# Patient Record
Sex: FEMALE | Race: BLACK OR AFRICAN AMERICAN | NOT HISPANIC OR LATINO
[De-identification: names, ages, dates, MRNs, and addresses within clinical notes are randomized per-mention and may not be internally consistent; named-entity substitution may affect disease eponyms.]

---

## 2019-03-18 ENCOUNTER — APPOINTMENT (OUTPATIENT)
Dept: PHARMACY | Facility: CLINIC | Age: 79
End: 2019-03-18

## 2021-08-12 ENCOUNTER — RESULT REVIEW (OUTPATIENT)
Age: 81
End: 2021-08-12

## 2021-08-13 ENCOUNTER — RESULT REVIEW (OUTPATIENT)
Age: 81
End: 2021-08-13

## 2021-08-20 ENCOUNTER — NON-APPOINTMENT (OUTPATIENT)
Age: 81
End: 2021-08-20

## 2021-08-30 ENCOUNTER — APPOINTMENT (OUTPATIENT)
Dept: GYNECOLOGIC ONCOLOGY | Facility: CLINIC | Age: 81
End: 2021-08-30
Payer: MEDICARE

## 2021-08-30 VITALS
HEIGHT: 68 IN | BODY MASS INDEX: 40.01 KG/M2 | DIASTOLIC BLOOD PRESSURE: 83 MMHG | SYSTOLIC BLOOD PRESSURE: 135 MMHG | WEIGHT: 264 LBS | HEART RATE: 84 BPM

## 2021-08-30 VITALS — HEIGHT: 68 IN | BODY MASS INDEX: 40.14 KG/M2

## 2021-08-30 DIAGNOSIS — Z80.0 FAMILY HISTORY OF MALIGNANT NEOPLASM OF DIGESTIVE ORGANS: ICD-10-CM

## 2021-08-30 PROCEDURE — 99205 OFFICE O/P NEW HI 60 MIN: CPT

## 2021-08-30 RX ORDER — MULTIVITAMIN
TABLET ORAL
Refills: 0 | Status: ACTIVE | COMMUNITY

## 2021-08-30 RX ORDER — TORSEMIDE 20 MG/1
20 TABLET ORAL
Refills: 0 | Status: ACTIVE | COMMUNITY

## 2021-09-07 ENCOUNTER — APPOINTMENT (OUTPATIENT)
Dept: CT IMAGING | Facility: CLINIC | Age: 81
End: 2021-09-07
Payer: MEDICARE

## 2021-09-07 ENCOUNTER — OUTPATIENT (OUTPATIENT)
Dept: OUTPATIENT SERVICES | Facility: HOSPITAL | Age: 81
LOS: 1 days | End: 2021-09-07
Payer: MEDICARE

## 2021-09-07 DIAGNOSIS — Z00.8 ENCOUNTER FOR OTHER GENERAL EXAMINATION: ICD-10-CM

## 2021-09-07 PROCEDURE — 74177 CT ABD & PELVIS W/CONTRAST: CPT | Mod: 26,MH

## 2021-09-07 PROCEDURE — 82565 ASSAY OF CREATININE: CPT

## 2021-09-07 PROCEDURE — 74177 CT ABD & PELVIS W/CONTRAST: CPT

## 2021-09-13 ENCOUNTER — NON-APPOINTMENT (OUTPATIENT)
Age: 81
End: 2021-09-13

## 2021-09-14 ENCOUNTER — NON-APPOINTMENT (OUTPATIENT)
Age: 81
End: 2021-09-14

## 2021-09-16 PROBLEM — E11.9 DIABETES: Status: ACTIVE | Noted: 2021-08-30

## 2021-09-16 PROBLEM — K86.9 PANCREATIC LESION: Status: ACTIVE | Noted: 2021-09-16

## 2021-09-16 PROBLEM — E78.5 HYPERLIPIDEMIA: Status: ACTIVE | Noted: 2021-08-30

## 2021-09-16 PROBLEM — G47.33 OSA ON CPAP: Status: ACTIVE | Noted: 2021-08-30

## 2021-09-16 PROBLEM — I10 HYPERTENSION: Status: ACTIVE | Noted: 2021-08-30

## 2021-09-17 ENCOUNTER — APPOINTMENT (OUTPATIENT)
Dept: GYNECOLOGIC ONCOLOGY | Facility: CLINIC | Age: 81
End: 2021-09-17
Payer: MEDICARE

## 2021-09-17 DIAGNOSIS — K86.9 DISEASE OF PANCREAS, UNSPECIFIED: ICD-10-CM

## 2021-09-17 DIAGNOSIS — I10 ESSENTIAL (PRIMARY) HYPERTENSION: ICD-10-CM

## 2021-09-17 DIAGNOSIS — E11.9 TYPE 2 DIABETES MELLITUS W/OUT COMPLICATIONS: ICD-10-CM

## 2021-09-17 DIAGNOSIS — E78.5 HYPERLIPIDEMIA, UNSPECIFIED: ICD-10-CM

## 2021-09-17 DIAGNOSIS — G47.33 OBSTRUCTIVE SLEEP APNEA (ADULT) (PEDIATRIC): ICD-10-CM

## 2021-09-17 DIAGNOSIS — Z99.89 OBSTRUCTIVE SLEEP APNEA (ADULT) (PEDIATRIC): ICD-10-CM

## 2021-09-17 PROCEDURE — 99215 OFFICE O/P EST HI 40 MIN: CPT

## 2021-10-24 ENCOUNTER — NON-APPOINTMENT (OUTPATIENT)
Age: 81
End: 2021-10-24

## 2021-10-28 ENCOUNTER — NON-APPOINTMENT (OUTPATIENT)
Age: 81
End: 2021-10-28

## 2021-10-29 ENCOUNTER — APPOINTMENT (OUTPATIENT)
Dept: GYNECOLOGIC ONCOLOGY | Facility: CLINIC | Age: 81
End: 2021-10-29
Payer: MEDICARE

## 2021-10-29 DIAGNOSIS — R10.9 UNSPECIFIED ABDOMINAL PAIN: ICD-10-CM

## 2021-10-29 PROCEDURE — 99213 OFFICE O/P EST LOW 20 MIN: CPT | Mod: 95

## 2021-10-31 PROBLEM — R10.9 ABDOMINAL PAIN IN FEMALE: Status: ACTIVE | Noted: 2021-10-29

## 2021-11-01 ENCOUNTER — RESULT REVIEW (OUTPATIENT)
Age: 81
End: 2021-11-01

## 2021-11-05 ENCOUNTER — OUTPATIENT (OUTPATIENT)
Dept: OUTPATIENT SERVICES | Facility: HOSPITAL | Age: 81
LOS: 1 days | End: 2021-11-05
Payer: MEDICARE

## 2021-11-05 ENCOUNTER — APPOINTMENT (OUTPATIENT)
Dept: CT IMAGING | Facility: CLINIC | Age: 81
End: 2021-11-05
Payer: MEDICARE

## 2021-11-05 DIAGNOSIS — R10.9 UNSPECIFIED ABDOMINAL PAIN: ICD-10-CM

## 2021-11-05 PROCEDURE — G1004: CPT

## 2021-11-05 PROCEDURE — 74176 CT ABD & PELVIS W/O CONTRAST: CPT | Mod: MG

## 2021-11-05 PROCEDURE — 74176 CT ABD & PELVIS W/O CONTRAST: CPT | Mod: 26,MG

## 2021-11-09 ENCOUNTER — OUTPATIENT (OUTPATIENT)
Dept: OUTPATIENT SERVICES | Facility: HOSPITAL | Age: 81
LOS: 1 days | End: 2021-11-09
Payer: MEDICARE

## 2021-11-09 VITALS
DIASTOLIC BLOOD PRESSURE: 80 MMHG | OXYGEN SATURATION: 97 % | HEIGHT: 65 IN | RESPIRATION RATE: 20 BRPM | WEIGHT: 266.1 LBS | SYSTOLIC BLOOD PRESSURE: 110 MMHG | HEART RATE: 78 BPM | TEMPERATURE: 97 F

## 2021-11-09 DIAGNOSIS — C54.1 MALIGNANT NEOPLASM OF ENDOMETRIUM: ICD-10-CM

## 2021-11-09 DIAGNOSIS — E11.9 TYPE 2 DIABETES MELLITUS WITHOUT COMPLICATIONS: ICD-10-CM

## 2021-11-09 DIAGNOSIS — E66.01 MORBID (SEVERE) OBESITY DUE TO EXCESS CALORIES: ICD-10-CM

## 2021-11-09 DIAGNOSIS — I25.10 ATHEROSCLEROTIC HEART DISEASE OF NATIVE CORONARY ARTERY WITHOUT ANGINA PECTORIS: ICD-10-CM

## 2021-11-09 DIAGNOSIS — Z96.659 PRESENCE OF UNSPECIFIED ARTIFICIAL KNEE JOINT: Chronic | ICD-10-CM

## 2021-11-09 LAB
A1C WITH ESTIMATED AVERAGE GLUCOSE RESULT: 6.9 % — HIGH (ref 4–5.6)
ALBUMIN SERPL ELPH-MCNC: 4.5 G/DL — SIGNIFICANT CHANGE UP (ref 3.3–5)
ALP SERPL-CCNC: 90 U/L — SIGNIFICANT CHANGE UP (ref 40–120)
ALT FLD-CCNC: 12 U/L — SIGNIFICANT CHANGE UP (ref 4–33)
ANION GAP SERPL CALC-SCNC: 15 MMOL/L — HIGH (ref 7–14)
AST SERPL-CCNC: 13 U/L — SIGNIFICANT CHANGE UP (ref 4–32)
BILIRUB SERPL-MCNC: 0.5 MG/DL — SIGNIFICANT CHANGE UP (ref 0.2–1.2)
BLD GP AB SCN SERPL QL: NEGATIVE — SIGNIFICANT CHANGE UP
BUN SERPL-MCNC: 73 MG/DL — HIGH (ref 7–23)
CALCIUM SERPL-MCNC: 10.3 MG/DL — SIGNIFICANT CHANGE UP (ref 8.4–10.5)
CHLORIDE SERPL-SCNC: 93 MMOL/L — LOW (ref 98–107)
CO2 SERPL-SCNC: 30 MMOL/L — SIGNIFICANT CHANGE UP (ref 22–31)
CREAT SERPL-MCNC: 2.32 MG/DL — HIGH (ref 0.5–1.3)
ESTIMATED AVERAGE GLUCOSE: 151 — SIGNIFICANT CHANGE UP
GLUCOSE SERPL-MCNC: 143 MG/DL — HIGH (ref 70–99)
HCT VFR BLD CALC: 38 % — SIGNIFICANT CHANGE UP (ref 34.5–45)
HGB BLD-MCNC: 11.9 G/DL — SIGNIFICANT CHANGE UP (ref 11.5–15.5)
MCHC RBC-ENTMCNC: 27.4 PG — SIGNIFICANT CHANGE UP (ref 27–34)
MCHC RBC-ENTMCNC: 31.3 GM/DL — LOW (ref 32–36)
MCV RBC AUTO: 87.6 FL — SIGNIFICANT CHANGE UP (ref 80–100)
NRBC # BLD: 0 /100 WBCS — SIGNIFICANT CHANGE UP
NRBC # FLD: 0 K/UL — SIGNIFICANT CHANGE UP
PLATELET # BLD AUTO: 191 K/UL — SIGNIFICANT CHANGE UP (ref 150–400)
POTASSIUM SERPL-MCNC: 4.1 MMOL/L — SIGNIFICANT CHANGE UP (ref 3.5–5.3)
POTASSIUM SERPL-SCNC: 4.1 MMOL/L — SIGNIFICANT CHANGE UP (ref 3.5–5.3)
PROT SERPL-MCNC: 8.2 G/DL — SIGNIFICANT CHANGE UP (ref 6–8.3)
RBC # BLD: 4.34 M/UL — SIGNIFICANT CHANGE UP (ref 3.8–5.2)
RBC # FLD: 13.6 % — SIGNIFICANT CHANGE UP (ref 10.3–14.5)
RH IG SCN BLD-IMP: POSITIVE — SIGNIFICANT CHANGE UP
SODIUM SERPL-SCNC: 138 MMOL/L — SIGNIFICANT CHANGE UP (ref 135–145)
WBC # BLD: 6.3 K/UL — SIGNIFICANT CHANGE UP (ref 3.8–10.5)
WBC # FLD AUTO: 6.3 K/UL — SIGNIFICANT CHANGE UP (ref 3.8–10.5)

## 2021-11-09 PROCEDURE — 93010 ELECTROCARDIOGRAM REPORT: CPT

## 2021-11-09 RX ORDER — SODIUM CHLORIDE 9 MG/ML
1000 INJECTION, SOLUTION INTRAVENOUS
Refills: 0 | Status: DISCONTINUED | OUTPATIENT
Start: 2021-11-23 | End: 2021-11-24

## 2021-11-09 NOTE — H&P PST ADULT - ATTENDING COMMENTS
Seen in ASU. She reports no changes. Planned procedure disc, incl poss conversion to an open procedure (vert inc). Consent form reviewed. All Q/A. Case d/w Anesth team and Circ OR RN.

## 2021-11-09 NOTE — H&P PST ADULT - CARDIOVASCULAR COMMENTS
Hx CAD stent x1 2013 and recent hospitalization at [facility] - " water on heart" - pt unable to give details

## 2021-11-09 NOTE — H&P PST ADULT - MUSCULOSKELETAL COMMENTS
Pt c/o of lower back and neck pain with some ROM - uses walker for ambulation - hx b/l knee replacement and left knee tendon injury - pt c/o of pain left knee Pt c/o of lower back and neck pain - restricted ROM  - uses walker for ambulation - hx b/l knee replacement and left knee tendon injury - pt c/o of pain left knee

## 2021-11-09 NOTE — H&P PST ADULT - BACK COMMENTS
Pt using walker for ambulation - cannot stand straight  and c/o of pain lower back , lower abdomen and left knee

## 2021-11-09 NOTE — H&P PST ADULT - PROBLEM SELECTOR PLAN 3
Pt to stay on ASA 81 mg po preop for stent protection   CC requested - pt to see Cardio Friday - hx CAD, CHF, and recent hospitalization   Pt to take Entresto, Furosemide, Amlodipine , Isosorbide, Metalazone am DOS

## 2021-11-09 NOTE — H&P PST ADULT - LAST CARDIAC ANGIOGRAM/IMAGING
2013 1 stent placed - 10/ 2021 " [facility]  - repair of stent and I had fluid around my heart" 2013 1 stent placed - 10/ 2021 " [facility]  - repair of stent and I had fwater around my heart"

## 2021-11-09 NOTE — H&P PST ADULT - HISTORY OF PRESENT ILLNESS
Pt admits to COVID   Pt has had Pfizer vaccine   Patient instructed to contact surgeon's office concerning COVID test prior to surgery  Pty is a 81 yr old female scheduled for Robotic Assisted Total Laparoscopic Hysterectomy B/L Salpingo oophorectomy Lymph Node Sampling and Umbilical Hernia Repair wtih Dr Yusuf and Dr Guillory tentatively 11/23/21 - pt is poor historian and [place] - states she had abnormal vaginal bleeding and lower abdominal pain 2-3 months ago - biopsy positive and pt referred for surgery - pt continues to c/o of pelvic pain - pt hx CAD with stent placed 2013, and recent hospitalization at [facility] for 3 days - pt reports "water on heart" but unable to give further details. Hx DM, HTN, HLD, Sleep apnea, obesity, OA, knee disorder   Pt admits to COVID   Pt has had Pfizer vaccine   Patient instructed to contact surgeon's office concerning COVID test prior to surgery

## 2021-11-09 NOTE — H&P PST ADULT - GASTROINTESTINAL COMMENTS
Large umbilical hernia noted - pt denies pain or GI symptoms Pt c/o of discomfort with palpation across lower abdomen - large umbilical hernia noted

## 2021-11-09 NOTE — H&P PST ADULT - PROBLEM SELECTOR PLAN 1
Pt scheduled for surgery Robotic Assisted Total Laparoscopic Hysterectomy B/L Salpingo oophorectomy Lymph Node Sampling and Umbilical Hernia Repair wtih Dr Yusuf and Dr Guillory tentatively 11/23/21 and preop instructions including instructions for taking Famotidine on the day of surgery, given verbally and with use of  written materials, and patient confirming understanding of such instructions using  teach back method.  Pt has received MC from PCP

## 2021-11-09 NOTE — H&P PST ADULT - NSICDXPASTMEDICALHX_GEN_ALL_CORE_FT
PAST MEDICAL HISTORY:  CAD (coronary artery disease)     DM (diabetes mellitus)     Endometrial ca     H/O neuropathy     HLD (hyperlipidemia)     HTN (hypertension)     Obesity     Sleep apnea     Umbilical hernia      PAST MEDICAL HISTORY:  CAD (coronary artery disease)     Disorder of knee left tendon injury    DM (diabetes mellitus)     Endometrial ca     H/O neuropathy     HLD (hyperlipidemia)     HTN (hypertension)     Obesity     Sleep apnea     Umbilical hernia

## 2021-11-10 ENCOUNTER — NON-APPOINTMENT (OUTPATIENT)
Age: 81
End: 2021-11-10

## 2021-11-19 DIAGNOSIS — Z01.818 ENCOUNTER FOR OTHER PREPROCEDURAL EXAMINATION: ICD-10-CM

## 2021-11-20 ENCOUNTER — APPOINTMENT (OUTPATIENT)
Dept: DISASTER EMERGENCY | Facility: CLINIC | Age: 81
End: 2021-11-20

## 2021-11-21 LAB — SARS-COV-2 N GENE NPH QL NAA+PROBE: NOT DETECTED

## 2021-11-22 ENCOUNTER — TRANSCRIPTION ENCOUNTER (OUTPATIENT)
Age: 81
End: 2021-11-22

## 2021-11-22 NOTE — ASU PATIENT PROFILE, ADULT - NS TRANSFER DISPOSITION PATIENT BELONGINGS
ASU locker #16, walker in back of ASU ASU locker #16, walker in back of ASU, suitcase under back desk in ASU

## 2021-11-22 NOTE — ASU PATIENT PROFILE, ADULT - PREOP PAIN SCORE
Espasmo muscular   LO QUE NECESITA SABER:   Un espasmo muscular es gypsy contracción convulsiva involuntaria de cualquier músculo o de un jerrod de músculos  Un calambre muscular es un espasmo muscular muy doloroso  Los calambres musculares generalmente ocurren después del ejercicio intenso o debra el [facility]  Estos también pueden ser provocados por ciertos medicamentos, la deshidratación, bajos niveles de calcio o magnesio u otras condiciones de Pamela Gong Rosas [facility]ITAL[facility]:   Medicamentos:  Usted podría  necesitar lo siguiente:  · AINEs (Analgésicos antiinflamatorios no esteroides)  pueden disminuir la inflamación y el dolor o la fiebre  Daquan medicamento esta disponible con o sin gypsy receta médica  Los AINEs pueden causar sangrado estomacal o problemas renales en ciertas personas  Si usted cassy un medicamento anticoagulante, siempre pregúntele a adkins médico si los TIANA son seguros para usted  Siempre vinita la etiqueta de daquan medicamento y [place] instrucciones  · [place] lory medicamentos baron se le haya indicado  Consulte con adkins médico si usted armen que adkins medicamento no le está ayudando o si presenta efectos secundarios  Infórmele si es alérgico a algún medicamento  Mantenga gypsy lista actualizada de los [employer], las vitaminas y los productos herbales que cassy  Incluya los siguientes datos de los medicamentos: cantidad, frecuencia y motivo de administración  Traiga con usted la lista o los envases de la píldoras a lory citas de seguimiento  Lleve la lista de los medicamentos con usted en neel de gypsy emergencia  Acuda a lory consultas de control con adkins médico según le indicaron  Usted puede necesitar otros exámenes o tratamientos  También es posible que lo refieran a un fisioterapeuta u otro especialista  Anote lory preguntas para que se acuerde de hacerlas debra lory visitas  Cuidados personales:   · El estiramiento  de adkins músculo ayuda a aliviar el calambre   También puede ser de Chris Ang mantener el músculo estirado hasta que el calambre desaparezca  · Aplique calor  para ayudar a disminuir el dolor y el espasmo muscular  Aplíquese calor en el área lesionada debra 20 a 30 minutos cada 2 horas debra la cantidad de [employer] indiquen  · Aplique hielo  para ayudar a disminuir la inflamación y el dolor  El hielo también puede contribuir a evitar el daño de los tejidos  Use un paquete de hielo o ponga hielo molido dentro de [employer]  Envuelva la compresa o bolsa con gypsy toalla y colóquela sobre adkins músculo por 15 a 20 minutos cada hora o baron se lo indicaron  · Consuma más líquidos  para ayudar a prevenir espasmos musculares causados por la deshidratación  Las bebidas atléticas pueden ayudar a reemplazar los electrolitos que pierde debra el ejercicio por la sudoración  Pregunte a adkins médico sobre la cantidad de líquido que necesita asif todos los días y cuáles le recomienda  · Consuma alimentos saludables , baron frutas, verduras, granos integrales, productos lácteos bajos en grasa y proteínas bajas en grasa (carne Annitta Francisco, legumbres y pescado)  Si está embarazada, pregúntele a adkins médico qué alimentos son ricos en magnesio y [place]  Estos pueden ayudar para [employer] calambres debra el [place]  · Dé masajes suaves sobre el músculo  para aliviar el calambre  · Respire profundo varias veces  hasta que el calambre se mejore  Acuéstese mientras respira profundo para que no sufra un mareo o desvanecimiento  Pregúntele a adkins Maxcine Megha vitaminas y minerales son adecuados para usted  · Usted presenta signos de deshidratación, baron dolor de [place] de color amarillo oscuro, ojos o boca secos o latidos cardíacos rápidos  · Usted tiene preguntas o inquietudes acerca de adkins condición o cuidado  Regrese a la mitra de emergencias si:   · El músculo con el calambre está caliente al tacto, inflamado o enrojecido       · Usted sufre con frecuencia y sin alivio de calambres musculares en varios músculos diferentes  · Usted presenta calambres musculares con entumecimiento, cosquilleo y sensación quemante en lory nusrat y pies  © 2017 [address] Information is for End User's use only and may not be sold, redistributed or otherwise used for commercial purposes  All illustrations and images included in CareNotes® are the copyrighted property of A D A M , Inc  or Don Sprague  Esta información es sólo para uso en educación  Feldman intención no es darle un consejo médico sobre enfermedades o tratamientos  Colsulte con feldman Marisela Antis farmacéutico antes de seguir cualquier régimen médico para saber si es seguro y efectivo para usted  Esguince de hombro   LO QUE NECESITA SABER:   Se produce un esguince de hombro cuando se estira o se desgarra un ligamento del hombro  Los ligamentos son los tejidos resistentes que [employer]  Los ligamentos permiten alzar, bajar y hacer girar el Magdy Pagan  INSTRUCCIONES SOBRE EL J[facility]OSPITALARIA:   Regrese a la mitra de emergencias si:   · A usted le hace falta el aire  · Siente que se le thad la garganta o tiene dificultad para tragar  · Siente dolor repentino y marry en el pecho del mismo lado de la lesión  · Feldman piel se siente fría o húmeda al tacto  Pregúntele a feldman Joie Show vitaminas y minerales son adecuados para usted  · La piel del hombro lesionado se pone de color cony o pálida  · [employer] dolor o se le hincha más el hombro, o comienza a tener dolor o hinchazón  · Siente más rigidez o comienza a sentir rigidez cuando mueve el hombro lesionado  · Usted tiene preguntas o inquietudes acerca de feldman condición o cuidado  Medicamentos:   · Un medicamento con receta para el dolor  podrían ser [place]  Pregunte cómo asif estos medicamentos de gypsy forma arriaga  · [employer] lory medicamentos baron se le haya indicado    Consulte con feldman médico si usted armen que feldman medicamento no le está ayudando o si presenta efectos secundarios  Infórmele si es alérgico a cualquier medicamento  Mantenga gypsy lista actualizada de los [employer], las vitaminas y los productos herbales que cassy  Incluya los siguientes datos de los medicamentos: cantidad, frecuencia y motivo de administración  Traiga con usted la lista o los envases de la píldoras a lory citas de seguimiento  Lleve la lista de los medicamentos con usted en neel de gypsy emergencia  Acuda a lory consultas de control con adkins médico según le indicaron  Anote lory preguntas para que se acuerde de hacerlas debra lory visitas  Cuidados personales:   · Descanse  adkins hombro para que pueda sanar  Evite  el hombro mientras que adkins lesión dianelys  Riverlea ayudará a disminuir el riesgo de que se linette incluso más daño en el hombro  · Aplique hielo  en el hombro de 15 a 20 minutos cada hora o baron se lo indiquen  Use un paquete de hielo o ponga hielo molido dentro de [employer]  Cúbrala con gypsy toalla  El hielo ayuda a evitar daño al tejido y a disminuir la inflamación y el dolor  · Compresión adkins hombro según indicaciones  La compresión (presión jona) marvel [de-identified] y contribuye a bajar la inflamación y limitar el movimiento para que adkins hombro pueda sanar  Si adkins esguince es leve, puede que le den un cabestrillo para apoyar el Yonis Chute  Puede que necesite un soporte acolchado o un yeso para mantener el hombro en adkins lugar si el esguince es más grave  Cómo usar un soporte, cabestrillo o férula:  Es posible que deba usar un soporte, cabestrillo o férula para limitar el movimiento y proteger el hombro lesionado  · Use adkins soporte, cabestrillo o férula según indicaciones  Es posible que necesite llevarlo todo el tiempo y solamente quitarlo para bañarse o para hacer lory ejercicios  Pregúntele a adkins médico cuánto tiempo debe usarlo  · Mantenga adkins piel limpia y [place]    El acolchado debajo de la axila ayudará a absorber la transpiración y evitar que le salgan llagas en la piel  · No encorve los hombros   [place] puede causar dolor  Mantenga los hombros relajados  · Coloque el cabestrillo encima del brazo y la mano, de modo que también le cubra los nudillos  De daquan modo el cabestrillo también le sostendrá la charles y la Mcgregor  Coloque la charles por encima de la altura del codo  Se le podría adormecer o empezar a dolerle la charles si el cabestrillo es [employer]  Ejercite el hombro:  Después de descansar el hombro debra 3 a 7 días, tendrá que hacer ejercicios ligeros para disminuir la rigidez del hombro  Consulte con feldman médico antes de retomar lory actividades acostumbradas o volver a practicar deportes  Evite otra lesión:  Puede lastimarse el hombro de nuevo si detiene el tratamiento demasiado pronto  Lo siguiente podría disminuir feldman riesgo de hacerse un esguince:  · No linette ejercicio cuando esté cansado o con dolor  Entre en calor y estírese antes de hacer ejercicio  · Use equipo de protección cuando practique deportes  · Use zapatos que le calcen yuan y corra sobre superficies yifan para no caerse  © 2017 [address] Information is for End User's use only and may not be sold, redistributed or otherwise used for commercial purposes  All illustrations and images included in CareNotes® are the copyrighted property of A D A M , Inc  or oDn Sprague  Esta información es sólo para uso en educación  Feldman intención no es darle un consejo médico sobre enfermedades o tratamientos  Colsulte con feldman Verlon Joesph farmacéutico antes de seguir cualquier régimen médico para saber si es seguro y efectivo para usted  Renovación de la receta de medicamentos   LO QUE NECESITA SABER:   Es probable que en el departamento de emergencias le hayan dado gypsy receta para asif medicación por algunos días  Es importante volverse a surtir de feldman medicamento antes de que se le termine   En los próximos días, usted necesita programar gypsy shannon de seguimiento con adkins médico para que le de gypsy receta completa para adkins medicamento  En el departamento de emergencias, usted no recibirá [place]  INSTRUCCIONES SOBRE [facility]OSPITAL[facility]IA:   Programe gypsy consulta de seguimiento con adkins médico:  Comuníquese con adkins médico antes  de que se le termine adkins medicamento por completo  Anote lory preguntas para que se acuerde de hacerlas debra lory visitas  Consejos para resurtido de medicamentos  Adkins medicamento tratará adkins trastorno médico si usted lo cassy con regularidad  Prevenga el olvido de dosis haciendo lo siguiente:  · Use gypsy gráfica para adkins medicamento  Incluya todos lory medicamentos actuales  Anote el nombre y la dosis de cada medicamento, el número del receta y el número de veces que puede surtirse  También anote las fechas de cuándo necesita volverlos a surtir  Pregunte en la farmacia o a adkins médico sobre otras formas de ayudarle a estar pendiente de lory medicamentos  · Surta lory medicamentos unos días antes de que se le terminen  [place] disminuirá cualquier problema que pueda prevenir que usted reciba lory medicamentos a [place]  Los problemas incluyen que la farmacia esté cerrada o que la farmacia tenga que comunicarse con adkins médico     · Si usted sabe que tiene que viajar, vuelva a surtir lory medicamentos antes de salir de viaje  Es probable que usted no pueda surtir lory medicamentos si no [place] adkins farmacia local  También es probable que necesite llamar a adkins compañía de seguro médico para que estén informados de lory viajes  © 2017 [address] Information is for End User's use only and may not be sold, redistributed or otherwise used for commercial purposes  All illustrations and images included in CareNotes® are the copyrighted property of A D A M , Inc  or Semi[employer]  Esta información es sólo para uso en educación  Adkins intención no es darle un consejo médico sobre enfermedades o tratamientos   Colsulte con adkins Loza Bedoya farmacéutico antes de seguir cualquier régimen médico para saber si es seguro y efectivo para usted  0

## 2021-11-22 NOTE — ASU PATIENT PROFILE, ADULT - NSICDXPASTMEDICALHX_GEN_ALL_CORE_FT
PAST MEDICAL HISTORY:  CAD (coronary artery disease)     Disorder of knee left tendon injury    DM (diabetes mellitus)     Endometrial ca     H/O neuropathy     HLD (hyperlipidemia)     HTN (hypertension)     Obesity     Sleep apnea     Umbilical hernia

## 2021-11-23 ENCOUNTER — INPATIENT (INPATIENT)
Facility: HOSPITAL | Age: 81
LOS: 2 days | Discharge: HOME | End: 2021-11-26
Attending: OBSTETRICS & GYNECOLOGY | Admitting: OBSTETRICS & GYNECOLOGY
Payer: MEDICARE

## 2021-11-23 ENCOUNTER — APPOINTMENT (OUTPATIENT)
Dept: GYNECOLOGIC ONCOLOGY | Facility: HOSPITAL | Age: 81
End: 2021-11-23

## 2021-11-23 ENCOUNTER — RESULT REVIEW (OUTPATIENT)
Age: 81
End: 2021-11-23

## 2021-11-23 VITALS
SYSTOLIC BLOOD PRESSURE: 127 MMHG | TEMPERATURE: 99 F | DIASTOLIC BLOOD PRESSURE: 66 MMHG | HEART RATE: 82 BPM | OXYGEN SATURATION: 96 % | HEIGHT: 65 IN | RESPIRATION RATE: 18 BRPM | WEIGHT: 266.1 LBS

## 2021-11-23 DIAGNOSIS — E66.01 MORBID (SEVERE) OBESITY DUE TO EXCESS CALORIES: ICD-10-CM

## 2021-11-23 DIAGNOSIS — C54.1 MALIGNANT NEOPLASM OF ENDOMETRIUM: ICD-10-CM

## 2021-11-23 DIAGNOSIS — Z96.659 PRESENCE OF UNSPECIFIED ARTIFICIAL KNEE JOINT: Chronic | ICD-10-CM

## 2021-11-23 LAB
ANION GAP SERPL CALC-SCNC: 17 MMOL/L — HIGH (ref 7–14)
BASOPHILS # BLD AUTO: 0.03 K/UL — SIGNIFICANT CHANGE UP (ref 0–0.2)
BASOPHILS NFR BLD AUTO: 0.3 % — SIGNIFICANT CHANGE UP (ref 0–2)
BLOOD GAS ARTERIAL - LYTES,HGB,ICA,LACT RESULT: SIGNIFICANT CHANGE UP
BUN SERPL-MCNC: 69 MG/DL — HIGH (ref 7–23)
CALCIUM SERPL-MCNC: 9.9 MG/DL — SIGNIFICANT CHANGE UP (ref 8.4–10.5)
CHLORIDE SERPL-SCNC: 98 MMOL/L — SIGNIFICANT CHANGE UP (ref 98–107)
CO2 SERPL-SCNC: 25 MMOL/L — SIGNIFICANT CHANGE UP (ref 22–31)
CREAT SERPL-MCNC: 1.92 MG/DL — HIGH (ref 0.5–1.3)
EOSINOPHIL # BLD AUTO: 0.01 K/UL — SIGNIFICANT CHANGE UP (ref 0–0.5)
EOSINOPHIL NFR BLD AUTO: 0.1 % — SIGNIFICANT CHANGE UP (ref 0–6)
GAS PNL BLDA: SIGNIFICANT CHANGE UP
GLUCOSE BLDC GLUCOMTR-MCNC: 153 MG/DL — HIGH (ref 70–99)
GLUCOSE BLDC GLUCOMTR-MCNC: 214 MG/DL — HIGH (ref 70–99)
GLUCOSE BLDC GLUCOMTR-MCNC: 223 MG/DL — HIGH (ref 70–99)
GLUCOSE SERPL-MCNC: 233 MG/DL — HIGH (ref 70–99)
HCT VFR BLD CALC: 35.9 % — SIGNIFICANT CHANGE UP (ref 34.5–45)
HGB BLD-MCNC: 11.8 G/DL — SIGNIFICANT CHANGE UP (ref 11.5–15.5)
IANC: 8.18 K/UL — SIGNIFICANT CHANGE UP (ref 1.5–8.5)
IMM GRANULOCYTES NFR BLD AUTO: 0.4 % — SIGNIFICANT CHANGE UP (ref 0–1.5)
LYMPHOCYTES # BLD AUTO: 0.97 K/UL — LOW (ref 1–3.3)
LYMPHOCYTES # BLD AUTO: 10.3 % — LOW (ref 13–44)
MAGNESIUM SERPL-MCNC: 2.3 MG/DL — SIGNIFICANT CHANGE UP (ref 1.6–2.6)
MCHC RBC-ENTMCNC: 28.3 PG — SIGNIFICANT CHANGE UP (ref 27–34)
MCHC RBC-ENTMCNC: 32.9 GM/DL — SIGNIFICANT CHANGE UP (ref 32–36)
MCV RBC AUTO: 86.1 FL — SIGNIFICANT CHANGE UP (ref 80–100)
MONOCYTES # BLD AUTO: 0.15 K/UL — SIGNIFICANT CHANGE UP (ref 0–0.9)
MONOCYTES NFR BLD AUTO: 1.6 % — LOW (ref 2–14)
NEUTROPHILS # BLD AUTO: 8.18 K/UL — HIGH (ref 1.8–7.4)
NEUTROPHILS NFR BLD AUTO: 87.3 % — HIGH (ref 43–77)
NRBC # BLD: 0 /100 WBCS — SIGNIFICANT CHANGE UP
NRBC # FLD: 0 K/UL — SIGNIFICANT CHANGE UP
PHOSPHATE SERPL-MCNC: 3 MG/DL — SIGNIFICANT CHANGE UP (ref 2.5–4.5)
PLATELET # BLD AUTO: 202 K/UL — SIGNIFICANT CHANGE UP (ref 150–400)
POTASSIUM SERPL-MCNC: 3.1 MMOL/L — LOW (ref 3.5–5.3)
POTASSIUM SERPL-SCNC: 3.1 MMOL/L — LOW (ref 3.5–5.3)
RBC # BLD: 4.17 M/UL — SIGNIFICANT CHANGE UP (ref 3.8–5.2)
RBC # FLD: 13.7 % — SIGNIFICANT CHANGE UP (ref 10.3–14.5)
SODIUM SERPL-SCNC: 140 MMOL/L — SIGNIFICANT CHANGE UP (ref 135–145)
WBC # BLD: 9.38 K/UL — SIGNIFICANT CHANGE UP (ref 3.8–10.5)
WBC # FLD AUTO: 9.38 K/UL — SIGNIFICANT CHANGE UP (ref 3.8–10.5)

## 2021-11-23 PROCEDURE — 88305 TISSUE EXAM BY PATHOLOGIST: CPT | Mod: 26

## 2021-11-23 PROCEDURE — 88341 IMHCHEM/IMCYTCHM EA ADD ANTB: CPT | Mod: 26

## 2021-11-23 PROCEDURE — 88342 IMHCHEM/IMCYTCHM 1ST ANTB: CPT | Mod: 26

## 2021-11-23 PROCEDURE — 88112 CYTOPATH CELL ENHANCE TECH: CPT | Mod: 26

## 2021-11-23 PROCEDURE — 88302 TISSUE EXAM BY PATHOLOGIST: CPT | Mod: 26

## 2021-11-23 PROCEDURE — 88307 TISSUE EXAM BY PATHOLOGIST: CPT | Mod: 26

## 2021-11-23 RX ORDER — AMLODIPINE BESYLATE 2.5 MG/1
1 TABLET ORAL
Qty: 0 | Refills: 0 | DISCHARGE

## 2021-11-23 RX ORDER — ISOSORBIDE DINITRATE 5 MG/1
1 TABLET ORAL
Qty: 0 | Refills: 0 | DISCHARGE

## 2021-11-23 RX ORDER — ONDANSETRON 8 MG/1
4 TABLET, FILM COATED ORAL ONCE
Refills: 0 | Status: DISCONTINUED | OUTPATIENT
Start: 2021-11-23 | End: 2021-11-23

## 2021-11-23 RX ORDER — HYDROMORPHONE HYDROCHLORIDE 2 MG/ML
1 INJECTION INTRAMUSCULAR; INTRAVENOUS; SUBCUTANEOUS
Refills: 0 | Status: DISCONTINUED | OUTPATIENT
Start: 2021-11-23 | End: 2021-11-23

## 2021-11-23 RX ORDER — HYDROMORPHONE HYDROCHLORIDE 2 MG/ML
0.5 INJECTION INTRAMUSCULAR; INTRAVENOUS; SUBCUTANEOUS
Refills: 0 | Status: DISCONTINUED | OUTPATIENT
Start: 2021-11-23 | End: 2021-11-23

## 2021-11-23 RX ORDER — HEPARIN SODIUM 5000 [USP'U]/ML
5000 INJECTION INTRAVENOUS; SUBCUTANEOUS EVERY 8 HOURS
Refills: 0 | Status: DISCONTINUED | OUTPATIENT
Start: 2021-11-23 | End: 2021-11-26

## 2021-11-23 RX ORDER — OXYCODONE HYDROCHLORIDE 5 MG/1
5 TABLET ORAL EVERY 4 HOURS
Refills: 0 | Status: DISCONTINUED | OUTPATIENT
Start: 2021-11-23 | End: 2021-11-26

## 2021-11-23 RX ORDER — GLIMEPIRIDE 1 MG
1 TABLET ORAL
Qty: 0 | Refills: 0 | DISCHARGE

## 2021-11-23 RX ORDER — INSULIN LISPRO 100/ML
VIAL (ML) SUBCUTANEOUS
Refills: 0 | Status: DISCONTINUED | OUTPATIENT
Start: 2021-11-23 | End: 2021-11-26

## 2021-11-23 RX ORDER — SACUBITRIL AND VALSARTAN 24; 26 MG/1; MG/1
1 TABLET, FILM COATED ORAL
Qty: 0 | Refills: 0 | DISCHARGE

## 2021-11-23 RX ORDER — ASPIRIN/CALCIUM CARB/MAGNESIUM 324 MG
1 TABLET ORAL
Qty: 0 | Refills: 0 | DISCHARGE

## 2021-11-23 RX ORDER — ATORVASTATIN CALCIUM 80 MG/1
1 TABLET, FILM COATED ORAL
Qty: 0 | Refills: 0 | DISCHARGE

## 2021-11-23 RX ORDER — FUROSEMIDE 40 MG
1 TABLET ORAL
Qty: 0 | Refills: 0 | DISCHARGE

## 2021-11-23 RX ORDER — CHLORHEXIDINE GLUCONATE 213 G/1000ML
1 SOLUTION TOPICAL DAILY
Refills: 0 | Status: DISCONTINUED | OUTPATIENT
Start: 2021-11-23 | End: 2021-11-24

## 2021-11-23 RX ORDER — GLUCAGON INJECTION, SOLUTION 0.5 MG/.1ML
1 INJECTION, SOLUTION SUBCUTANEOUS ONCE
Refills: 0 | Status: DISCONTINUED | OUTPATIENT
Start: 2021-11-23 | End: 2021-11-26

## 2021-11-23 RX ORDER — METOPROLOL TARTRATE 50 MG
5 TABLET ORAL EVERY 6 HOURS
Refills: 0 | Status: DISCONTINUED | OUTPATIENT
Start: 2021-11-23 | End: 2021-11-24

## 2021-11-23 RX ORDER — ACETAMINOPHEN 500 MG
1000 TABLET ORAL ONCE
Refills: 0 | Status: COMPLETED | OUTPATIENT
Start: 2021-11-23 | End: 2021-11-23

## 2021-11-23 RX ORDER — SODIUM CHLORIDE 9 MG/ML
1000 INJECTION, SOLUTION INTRAVENOUS
Refills: 0 | Status: DISCONTINUED | OUTPATIENT
Start: 2021-11-23 | End: 2021-11-24

## 2021-11-23 RX ORDER — FENTANYL CITRATE 50 UG/ML
50 INJECTION INTRAVENOUS
Refills: 0 | Status: DISCONTINUED | OUTPATIENT
Start: 2021-11-23 | End: 2021-11-23

## 2021-11-23 RX ORDER — POTASSIUM CHLORIDE 20 MEQ
40 PACKET (EA) ORAL ONCE
Refills: 0 | Status: COMPLETED | OUTPATIENT
Start: 2021-11-23 | End: 2021-11-23

## 2021-11-23 RX ORDER — GABAPENTIN 400 MG/1
1 CAPSULE ORAL
Qty: 0 | Refills: 0 | DISCHARGE

## 2021-11-23 RX ORDER — ACETAMINOPHEN 500 MG
1000 TABLET ORAL ONCE
Refills: 0 | Status: COMPLETED | OUTPATIENT
Start: 2021-11-23 | End: 2021-11-24

## 2021-11-23 RX ADMIN — Medication 400 MILLIGRAM(S): at 23:49

## 2021-11-23 RX ADMIN — OXYCODONE HYDROCHLORIDE 5 MILLIGRAM(S): 5 TABLET ORAL at 21:27

## 2021-11-23 RX ADMIN — SODIUM CHLORIDE 30 MILLILITER(S): 9 INJECTION, SOLUTION INTRAVENOUS at 11:29

## 2021-11-23 RX ADMIN — HYDROMORPHONE HYDROCHLORIDE 1 MILLIGRAM(S): 2 INJECTION INTRAMUSCULAR; INTRAVENOUS; SUBCUTANEOUS at 20:49

## 2021-11-23 RX ADMIN — FENTANYL CITRATE 50 MICROGRAM(S): 50 INJECTION INTRAVENOUS at 22:00

## 2021-11-23 RX ADMIN — OXYCODONE HYDROCHLORIDE 5 MILLIGRAM(S): 5 TABLET ORAL at 21:57

## 2021-11-23 RX ADMIN — FENTANYL CITRATE 50 MICROGRAM(S): 50 INJECTION INTRAVENOUS at 21:30

## 2021-11-23 RX ADMIN — HEPARIN SODIUM 5000 UNIT(S): 5000 INJECTION INTRAVENOUS; SUBCUTANEOUS at 22:53

## 2021-11-23 RX ADMIN — HYDROMORPHONE HYDROCHLORIDE 1 MILLIGRAM(S): 2 INJECTION INTRAMUSCULAR; INTRAVENOUS; SUBCUTANEOUS at 20:29

## 2021-11-23 RX ADMIN — SODIUM CHLORIDE 125 MILLILITER(S): 9 INJECTION, SOLUTION INTRAVENOUS at 23:49

## 2021-11-23 RX ADMIN — CHLORHEXIDINE GLUCONATE 1 APPLICATION(S): 213 SOLUTION TOPICAL at 11:30

## 2021-11-23 RX ADMIN — Medication 40 MILLIEQUIVALENT(S): at 22:17

## 2021-11-23 RX ADMIN — Medication 2: at 21:17

## 2021-11-23 NOTE — BRIEF OPERATIVE NOTE - COMMENTS
Nir Hinson. I served as Dr Guillory's 1st assistant for the hernia repair as there was no qualified resident for that portion of the procedure.

## 2021-11-23 NOTE — BRIEF OPERATIVE NOTE - OPERATION/FINDINGS
EUA, Dil of Cx, Inj of ICG, L/S RHONA, Abel TLH, BSO, Pelvic LN Biopsies  Nl LGT, Sounded to 8+ cm. Medium V-Care.  L/S: No ascites or studding. Omentum incarcerated in Periumb defect; released. Vis Liver, SB, appy, LB nl. Ut ~8-10 weeks; adnexae nl. No mapping, Due to limited exposure, limited sampling undertaken. Ureters visualized.   Urine clear. Topical hemostats-V, SSNK. CC.

## 2021-11-23 NOTE — BRIEF OPERATIVE NOTE - NSICDXBRIEFPOSTOP_GEN_ALL_CORE_FT
POST-OP DIAGNOSIS:  Endometrial cancer 23-Nov-2021 19:31:54  Franko Yusuf  Ventral hernia 23-Nov-2021 19:32:12  Franko Yusuf

## 2021-11-23 NOTE — PROGRESS NOTE ADULT - SUBJECTIVE AND OBJECTIVE BOX
PA POST-OP CHECK GYN ONCOLOGY NOTE    Patient seen and examined at bedside in PACU. Patient awake and resting comfortably. Reports pain is under control at this time. Otherwise doing well. Patient denies fever, chills, chest pain, SOB, nausea, vomiting, lightheadedness, and dizziness.      T(F): 97.7 (11-23-21 @ 19:55), Max: 98.6 (11-23-21 @ 10:17)  HR: 78 (11-23-21 @ 21:30) (67 - 89)  BP: 140/70 (11-23-21 @ 21:30) (113/50 - 140/70)  RR: 15 (11-23-21 @ 21:30) (12 - 20)  SpO2: 96% (11-23-21 @ 21:30) (96% - 98%)      I&O's Detail    23 Nov 2021 07:01  -  23 Nov 2021 22:00  --------------------------------------------------------  IN:    Lactated Ringers: 250 mL  Total IN: 250 mL    OUT:    Indwelling Catheter - Urethral (mL): 325 mL  Total OUT: 325 mL    Total NET: -75 mL      Physical Exam:  General: NAD, resting in bed comfortably  Chest: CTA b/l  Heart: s1s2    Abdomen: soft, appropriately tender to palpation  Incisional site: clean, dry, intact  Extremities: no swelling or calf tenderness bilaterally      LABS:                        11.8   9.38  )-----------( 202      ( 23 Nov 2021 20:31 )             35.9     11-23    140  |  98  |  69<H>  ----------------------------<  233<H>  3.1<L>   |  25  |  1.92<H>    Ca    9.9      23 Nov 2021 20:31  Phos  3.0     11-23  Mg     2.30     11-23      MEDICATIONS  (STANDING):  acetaminophen   IVPB .. 1000 milliGRAM(s) IV Intermittent once  acetaminophen   IVPB .. 1000 milliGRAM(s) IV Intermittent once  chlorhexidine 2% Cloths 1 Application(s) Topical daily  glucagon  Injectable 1 milliGRAM(s) IntraMuscular once  heparin   Injectable 5000 Unit(s) SubCutaneous every 8 hours  insulin lispro (ADMELOG) corrective regimen sliding scale   SubCutaneous three times a day before meals  lactated ringers. 1000 milliLiter(s) (30 mL/Hr) IV Continuous <Continuous>  lactated ringers. 1000 milliLiter(s) (125 mL/Hr) IV Continuous <Continuous>  potassium chloride    Tablet ER 40 milliEquivalent(s) Oral once    MEDICATIONS  (PRN):  fentaNYL    Injectable 50 MICROGram(s) IV Push every 10 minutes PRN Mild Pain (1 - 3)  HYDROmorphone  Injectable 0.5 milliGRAM(s) IV Push every 10 minutes PRN Moderate Pain (4 - 6)  HYDROmorphone  Injectable 1 milliGRAM(s) IV Push every 10 minutes PRN Severe Pain (7 - 10)  metoprolol tartrate Injectable 5 milliGRAM(s) IV Push every 6 hours PRN BP >160/110  ondansetron Injectable 4 milliGRAM(s) IV Push once PRN Nausea and/or Vomiting  oxyCODONE    IR 5 milliGRAM(s) Oral every 4 hours PRN Moderate Pain (4 - 6)      A/P: This is a 81y female, POD#0, s/p RA-TLH, BSO, LND, Umbilical hernia repair. Patient is stable and doing well post operatively.    Plan:  Pain control as needed. Avoid NSAIDs  Anti-emetic as needed  Continuous pulse oximetry (for hx of DON)  Encourage incentive spirometer use  Hinson  Monitor I/Os  IV Hydration with LR at 125mL/hr  Clear liquid diet as tolerated  Out of bed with assist as tolerated  ISS (for hx of DM). Monitor FS  Lopressor PRN (for hx of HTN)  AM Labs. Replete electrolytes as needed  DVT ppx: Heparin subQ  Dispo: Admit to floor and routine post-op care    Patient's case and post-op condition discussed with overnight Gyn/Onc resident team. Above plans as per our discussion.  Spectra #[id number]

## 2021-11-23 NOTE — ASU PREOP CHECKLIST - SITE MARKED BY SURGEON
How Severe Are Your Spot(S)?: mild What Type Of Note Output Would You Prefer (Optional)?: Standard Output What Is The Reason For Today's Visit?: Full Body Skin Examination What Is The Reason For Today's Visit? (Being Monitored For X): concerning skin lesions on an annual basis yes

## 2021-11-23 NOTE — BRIEF OPERATIVE NOTE - NSICDXBRIEFPREOP_GEN_ALL_CORE_FT
PRE-OP DIAGNOSIS:  Endometrial cancer 23-Nov-2021 19:31:24  Franko Yusuf  Ventral hernia 23-Nov-2021 19:31:38  Franko Yusuf

## 2021-11-23 NOTE — ASU PREOP CHECKLIST - COMMENTS
pt took famotidine, entresto, gabapentin, furosemide, metalozone and isosorbide at 0530 with sips water

## 2021-11-24 DIAGNOSIS — C54.1 MALIGNANT NEOPLASM OF ENDOMETRIUM: ICD-10-CM

## 2021-11-24 DIAGNOSIS — E78.5 HYPERLIPIDEMIA, UNSPECIFIED: ICD-10-CM

## 2021-11-24 DIAGNOSIS — I50.20 UNSPECIFIED SYSTOLIC (CONGESTIVE) HEART FAILURE: ICD-10-CM

## 2021-11-24 DIAGNOSIS — Z98.890 OTHER SPECIFIED POSTPROCEDURAL STATES: ICD-10-CM

## 2021-11-24 DIAGNOSIS — I25.10 ATHEROSCLEROTIC HEART DISEASE OF NATIVE CORONARY ARTERY WITHOUT ANGINA PECTORIS: ICD-10-CM

## 2021-11-24 DIAGNOSIS — E11.9 TYPE 2 DIABETES MELLITUS WITHOUT COMPLICATIONS: ICD-10-CM

## 2021-11-24 DIAGNOSIS — Z29.9 ENCOUNTER FOR PROPHYLACTIC MEASURES, UNSPECIFIED: ICD-10-CM

## 2021-11-24 DIAGNOSIS — N18.9 CHRONIC KIDNEY DISEASE, UNSPECIFIED: ICD-10-CM

## 2021-11-24 LAB
ANION GAP SERPL CALC-SCNC: 12 MMOL/L — SIGNIFICANT CHANGE UP (ref 7–14)
BASOPHILS # BLD AUTO: 0.03 K/UL — SIGNIFICANT CHANGE UP (ref 0–0.2)
BASOPHILS NFR BLD AUTO: 0.3 % — SIGNIFICANT CHANGE UP (ref 0–2)
BUN SERPL-MCNC: 56 MG/DL — HIGH (ref 7–23)
CALCIUM SERPL-MCNC: 9.7 MG/DL — SIGNIFICANT CHANGE UP (ref 8.4–10.5)
CHLORIDE SERPL-SCNC: 101 MMOL/L — SIGNIFICANT CHANGE UP (ref 98–107)
CO2 SERPL-SCNC: 28 MMOL/L — SIGNIFICANT CHANGE UP (ref 22–31)
COVID-19 NUCLEOCAPSID GAM AB INTERP: NEGATIVE — SIGNIFICANT CHANGE UP
COVID-19 NUCLEOCAPSID TOTAL GAM ANTIBODY RESULT: 0.08 INDEX — SIGNIFICANT CHANGE UP
COVID-19 SPIKE DOMAIN AB INTERP: POSITIVE
COVID-19 SPIKE DOMAIN ANTIBODY RESULT: 154 U/ML — HIGH
CREAT SERPL-MCNC: 1.68 MG/DL — HIGH (ref 0.5–1.3)
EOSINOPHIL # BLD AUTO: 0 K/UL — SIGNIFICANT CHANGE UP (ref 0–0.5)
EOSINOPHIL NFR BLD AUTO: 0 % — SIGNIFICANT CHANGE UP (ref 0–6)
GLUCOSE BLDC GLUCOMTR-MCNC: 140 MG/DL — HIGH (ref 70–99)
GLUCOSE BLDC GLUCOMTR-MCNC: 155 MG/DL — HIGH (ref 70–99)
GLUCOSE BLDC GLUCOMTR-MCNC: 155 MG/DL — HIGH (ref 70–99)
GLUCOSE BLDC GLUCOMTR-MCNC: 170 MG/DL — HIGH (ref 70–99)
GLUCOSE SERPL-MCNC: 183 MG/DL — HIGH (ref 70–99)
HCT VFR BLD CALC: 35.1 % — SIGNIFICANT CHANGE UP (ref 34.5–45)
HGB BLD-MCNC: 11.2 G/DL — LOW (ref 11.5–15.5)
IANC: 9.87 K/UL — HIGH (ref 1.5–8.5)
IMM GRANULOCYTES NFR BLD AUTO: 0.5 % — SIGNIFICANT CHANGE UP (ref 0–1.5)
LYMPHOCYTES # BLD AUTO: 1.01 K/UL — SIGNIFICANT CHANGE UP (ref 1–3.3)
LYMPHOCYTES # BLD AUTO: 8.5 % — LOW (ref 13–44)
MAGNESIUM SERPL-MCNC: 2.3 MG/DL — SIGNIFICANT CHANGE UP (ref 1.6–2.6)
MCHC RBC-ENTMCNC: 28.5 PG — SIGNIFICANT CHANGE UP (ref 27–34)
MCHC RBC-ENTMCNC: 31.9 GM/DL — LOW (ref 32–36)
MCV RBC AUTO: 89.3 FL — SIGNIFICANT CHANGE UP (ref 80–100)
MONOCYTES # BLD AUTO: 0.9 K/UL — SIGNIFICANT CHANGE UP (ref 0–0.9)
MONOCYTES NFR BLD AUTO: 7.6 % — SIGNIFICANT CHANGE UP (ref 2–14)
NEUTROPHILS # BLD AUTO: 9.87 K/UL — HIGH (ref 1.8–7.4)
NEUTROPHILS NFR BLD AUTO: 83.1 % — HIGH (ref 43–77)
NRBC # BLD: 0 /100 WBCS — SIGNIFICANT CHANGE UP
NRBC # FLD: 0 K/UL — SIGNIFICANT CHANGE UP
PHOSPHATE SERPL-MCNC: 2.5 MG/DL — SIGNIFICANT CHANGE UP (ref 2.5–4.5)
PLATELET # BLD AUTO: 214 K/UL — SIGNIFICANT CHANGE UP (ref 150–400)
POTASSIUM SERPL-MCNC: 3.7 MMOL/L — SIGNIFICANT CHANGE UP (ref 3.5–5.3)
POTASSIUM SERPL-SCNC: 3.7 MMOL/L — SIGNIFICANT CHANGE UP (ref 3.5–5.3)
RBC # BLD: 3.93 M/UL — SIGNIFICANT CHANGE UP (ref 3.8–5.2)
RBC # FLD: 13.8 % — SIGNIFICANT CHANGE UP (ref 10.3–14.5)
SARS-COV-2 IGG+IGM SERPL QL IA: 0.08 INDEX — SIGNIFICANT CHANGE UP
SARS-COV-2 IGG+IGM SERPL QL IA: 154 U/ML — HIGH
SARS-COV-2 IGG+IGM SERPL QL IA: NEGATIVE — SIGNIFICANT CHANGE UP
SARS-COV-2 IGG+IGM SERPL QL IA: POSITIVE
SODIUM SERPL-SCNC: 141 MMOL/L — SIGNIFICANT CHANGE UP (ref 135–145)
WBC # BLD: 11.87 K/UL — HIGH (ref 3.8–10.5)
WBC # FLD AUTO: 11.87 K/UL — HIGH (ref 3.8–10.5)

## 2021-11-24 PROCEDURE — 99223 1ST HOSP IP/OBS HIGH 75: CPT

## 2021-11-24 RX ORDER — SACUBITRIL AND VALSARTAN 24; 26 MG/1; MG/1
1 TABLET, FILM COATED ORAL
Refills: 0 | Status: DISCONTINUED | OUTPATIENT
Start: 2021-11-24 | End: 2021-11-26

## 2021-11-24 RX ORDER — SODIUM,POTASSIUM PHOSPHATES 278-250MG
1 POWDER IN PACKET (EA) ORAL ONCE
Refills: 0 | Status: COMPLETED | OUTPATIENT
Start: 2021-11-24 | End: 2021-11-24

## 2021-11-24 RX ORDER — POTASSIUM CHLORIDE 20 MEQ
20 PACKET (EA) ORAL
Refills: 0 | Status: COMPLETED | OUTPATIENT
Start: 2021-11-24 | End: 2021-11-24

## 2021-11-24 RX ORDER — ASPIRIN/CALCIUM CARB/MAGNESIUM 324 MG
81 TABLET ORAL DAILY
Refills: 0 | Status: DISCONTINUED | OUTPATIENT
Start: 2021-11-24 | End: 2021-11-26

## 2021-11-24 RX ORDER — ACETAMINOPHEN 500 MG
975 TABLET ORAL EVERY 6 HOURS
Refills: 0 | Status: DISCONTINUED | OUTPATIENT
Start: 2021-11-24 | End: 2021-11-26

## 2021-11-24 RX ORDER — SENNA PLUS 8.6 MG/1
1 TABLET ORAL ONCE
Refills: 0 | Status: COMPLETED | OUTPATIENT
Start: 2021-11-24 | End: 2021-11-24

## 2021-11-24 RX ORDER — CARVEDILOL PHOSPHATE 80 MG/1
12.5 CAPSULE, EXTENDED RELEASE ORAL EVERY 12 HOURS
Refills: 0 | Status: DISCONTINUED | OUTPATIENT
Start: 2021-11-24 | End: 2021-11-26

## 2021-11-24 RX ORDER — ACETAMINOPHEN 500 MG
975 TABLET ORAL EVERY 6 HOURS
Refills: 0 | Status: COMPLETED | OUTPATIENT
Start: 2021-11-24 | End: 2022-10-23

## 2021-11-24 RX ADMIN — Medication 1000 MILLIGRAM(S): at 06:50

## 2021-11-24 RX ADMIN — Medication 1: at 18:12

## 2021-11-24 RX ADMIN — Medication 975 MILLIGRAM(S): at 14:50

## 2021-11-24 RX ADMIN — HEPARIN SODIUM 5000 UNIT(S): 5000 INJECTION INTRAVENOUS; SUBCUTANEOUS at 06:17

## 2021-11-24 RX ADMIN — OXYCODONE HYDROCHLORIDE 5 MILLIGRAM(S): 5 TABLET ORAL at 10:15

## 2021-11-24 RX ADMIN — SENNA PLUS 1 TABLET(S): 8.6 TABLET ORAL at 18:53

## 2021-11-24 RX ADMIN — Medication 1: at 08:12

## 2021-11-24 RX ADMIN — OXYCODONE HYDROCHLORIDE 5 MILLIGRAM(S): 5 TABLET ORAL at 14:50

## 2021-11-24 RX ADMIN — Medication 1000 MILLIGRAM(S): at 00:25

## 2021-11-24 RX ADMIN — Medication 81 MILLIGRAM(S): at 18:11

## 2021-11-24 RX ADMIN — SACUBITRIL AND VALSARTAN 1 TABLET(S): 24; 26 TABLET, FILM COATED ORAL at 18:11

## 2021-11-24 RX ADMIN — OXYCODONE HYDROCHLORIDE 5 MILLIGRAM(S): 5 TABLET ORAL at 10:45

## 2021-11-24 RX ADMIN — Medication 20 MILLIEQUIVALENT(S): at 14:21

## 2021-11-24 RX ADMIN — Medication 20 MILLIEQUIVALENT(S): at 10:16

## 2021-11-24 RX ADMIN — OXYCODONE HYDROCHLORIDE 5 MILLIGRAM(S): 5 TABLET ORAL at 03:31

## 2021-11-24 RX ADMIN — Medication 400 MILLIGRAM(S): at 06:17

## 2021-11-24 RX ADMIN — Medication 1 PACKET(S): at 14:21

## 2021-11-24 RX ADMIN — OXYCODONE HYDROCHLORIDE 5 MILLIGRAM(S): 5 TABLET ORAL at 14:19

## 2021-11-24 RX ADMIN — Medication 975 MILLIGRAM(S): at 21:33

## 2021-11-24 RX ADMIN — Medication 975 MILLIGRAM(S): at 14:20

## 2021-11-24 RX ADMIN — HEPARIN SODIUM 5000 UNIT(S): 5000 INJECTION INTRAVENOUS; SUBCUTANEOUS at 14:20

## 2021-11-24 RX ADMIN — HEPARIN SODIUM 5000 UNIT(S): 5000 INJECTION INTRAVENOUS; SUBCUTANEOUS at 21:33

## 2021-11-24 RX ADMIN — CARVEDILOL PHOSPHATE 12.5 MILLIGRAM(S): 80 CAPSULE, EXTENDED RELEASE ORAL at 18:11

## 2021-11-24 RX ADMIN — OXYCODONE HYDROCHLORIDE 5 MILLIGRAM(S): 5 TABLET ORAL at 04:05

## 2021-11-24 RX ADMIN — Medication 20 MILLIEQUIVALENT(S): at 12:33

## 2021-11-24 NOTE — PROGRESS NOTE ADULT - SUBJECTIVE AND OBJECTIVE BOX
Gyn ONC Progress Note POD #/HD#    Subjective:   Patient seen and examined at bedside.  No acute events overnight. No acute complaints.  Pain well controlled.  Patient is ambulating and tolerating.....   Has not yet passed flatus vs. Patient is passing flatus.    Patient is voiding spontaneously vs. Hinson is still in place.   Denies lightheadedness, dizziness, CP, SOB, N/V, fevers, and chills.    Objective:  T(F): 98.6 (11-24-21 @ 06:24), Max: 98.6 (11-23-21 @ 10:17)  HR: 65 (11-24-21 @ 06:24) (65 - 89)  BP: 136/61 (11-24-21 @ 06:24) (113/50 - 142/59)  RR: 17 (11-24-21 @ 06:24) (12 - 20)  SpO2: 95% (11-24-21 @ 06:24) (93% - 100%)  Wt(kg): --  I&O's Summary    23 Nov 2021 07:01  -  24 Nov 2021 07:00  --------------------------------------------------------  IN: 1150 mL / OUT: 2225 mL / NET: -1075 mL      CAPILLARY BLOOD GLUCOSE      POCT Blood Glucose.: 214 mg/dL (23 Nov 2021 23:04)  POCT Blood Glucose.: 223 mg/dL (23 Nov 2021 20:56)  POCT Blood Glucose.: 153 mg/dL (23 Nov 2021 11:01)      MEDICATIONS  (STANDING):  chlorhexidine 2% Cloths 1 Application(s) Topical daily  glucagon  Injectable 1 milliGRAM(s) IntraMuscular once  heparin   Injectable 5000 Unit(s) SubCutaneous every 8 hours  insulin lispro (ADMELOG) corrective regimen sliding scale   SubCutaneous three times a day before meals  lactated ringers. 1000 milliLiter(s) (30 mL/Hr) IV Continuous <Continuous>  lactated ringers. 1000 milliLiter(s) (125 mL/Hr) IV Continuous <Continuous>    MEDICATIONS  (PRN):  metoprolol tartrate Injectable 5 milliGRAM(s) IV Push every 6 hours PRN BP >160/110  oxyCODONE    IR 5 milliGRAM(s) Oral every 4 hours PRN Moderate Pain (4 - 6)      Physical Exam:  Constitutional: NAD, A+O x3  CV: RRR  Lungs: clear to auscultation bilaterally  Abdomen: soft, nondistended, no guarding, no rebound, normal bowel sounds  Incision: clean, dry, intact  Extremities: no lower extremity edema or calf tenderness bilaterally; venodynes in place    LABS:    11-23    140    |  98     |  69<H>  ----------------------------<  233<H>  3.1<L>   |  25     |  1.92<H>    Ca    9.9        23 Nov 2021 20:31  Phos  3.0       11-23  Mg     2.30      11-23                       Gyn ONC Progress Note POD #1/HD#2    Subjective:   Patient seen and examined at bedside.  No acute events overnight. No acute complaints.  Pain well controlled. Patient is ambulating and tolerating clear diet. Has not yet passed flatus.  Hinson is still in place. Denies lightheadedness, dizziness, CP, SOB, N/V, fevers, and chills.    Objective:  T(F): 98.6 (11-24-21 @ 06:24), Max: 98.6 (11-23-21 @ 10:17)  HR: 65 (11-24-21 @ 06:24) (65 - 89)  BP: 136/61 (11-24-21 @ 06:24) (113/50 - 142/59)  RR: 17 (11-24-21 @ 06:24) (12 - 20)  SpO2: 95% (11-24-21 @ 06:24) (93% - 100%)  Wt(kg): --  I&O's Summary    23 Nov 2021 07:01  -  24 Nov 2021 07:00  --------------------------------------------------------  IN: 1150 mL / OUT: 2225 mL / NET: -1075 mL      CAPILLARY BLOOD GLUCOSE      POCT Blood Glucose.: 214 mg/dL (23 Nov 2021 23:04)  POCT Blood Glucose.: 223 mg/dL (23 Nov 2021 20:56)  POCT Blood Glucose.: 153 mg/dL (23 Nov 2021 11:01)      MEDICATIONS  (STANDING):  chlorhexidine 2% Cloths 1 Application(s) Topical daily  glucagon  Injectable 1 milliGRAM(s) IntraMuscular once  heparin   Injectable 5000 Unit(s) SubCutaneous every 8 hours  insulin lispro (ADMELOG) corrective regimen sliding scale   SubCutaneous three times a day before meals  lactated ringers. 1000 milliLiter(s) (30 mL/Hr) IV Continuous <Continuous>  lactated ringers. 1000 milliLiter(s) (125 mL/Hr) IV Continuous <Continuous>    MEDICATIONS  (PRN):  metoprolol tartrate Injectable 5 milliGRAM(s) IV Push every 6 hours PRN BP >160/110  oxyCODONE    IR 5 milliGRAM(s) Oral every 4 hours PRN Moderate Pain (4 - 6)      Physical Exam:  Constitutional: NAD, A+O x3  CV: RRR  Lungs: clear to auscultation bilaterally  Abdomen: soft, nondistended, no guarding, no rebound, normal bowel sounds  Incision: laparoscopic and umbilical incision site clean, dry, intact  Extremities: no lower extremity edema or calf tenderness bilaterally; venodynes in place    LABS:               11.2   11.87 )-----------( 214      ( 11-24 @ 07:11 )             35.1                11.8   9.38  )-----------( 202      ( 11-23 @ 20:31 )             35.9                11.9   6.30  )-----------( 191      ( 11-09 @ 15:41 )             38.0         11-23    140    |  98     |  69<H>  ----------------------------<  233<H>  3.1<L>   |  25     |  1.92<H>    Ca    9.9        23 Nov 2021 20:31  Phos  3.0       11-23  Mg     2.30      11-23

## 2021-11-24 NOTE — PHYSICAL THERAPY INITIAL EVALUATION ADULT - LEVEL OF INDEPENDENCE: SUPINE/SIT, REHAB EVAL
*MODIFIED/ LONGSIT; pt deferred sitting up @ edge of bed after Maximal encouragement 2/2 abdominal pain; Pt's family not encouraging/contact guard/minimum assist (75% patients effort)

## 2021-11-24 NOTE — PROGRESS NOTE ADULT - PROBLEM SELECTOR PLAN 1
GYN ONC Fellow Addendum:    Pt seen and examined at bedside. Agree with above. Pain controlled. Tolerating clears. yet to ambulate. ulrich in situ    VS reviewed  Labs reviewed    - po analgesia  - ADAT  - GHOS recs appreciated   - PT eval  - continue ulrich pending PT eval  - Encourage ambulation and IS use  - Replete lytes prn  - DVT ppx  - OOB  - Dispo: continue inpatient management    ALVIN Frost, PGY6

## 2021-11-24 NOTE — PROGRESS NOTE ADULT - SUBJECTIVE AND OBJECTIVE BOX
Subjective:  Patient seen at bedside this AM. Reports feeling well, without complaints. Denies nausea, vomiting, chest pain, shortness of breath, or dizziness.     24h Events:   - Overnight, no acute events  - OR yesterday for TLH/BSO with GYN, umbilical hernia repair with Surgery    Objective:  Vital Signs  T(C): 36.8 (11-24 @ 09:05), Max: 37 (11-23 @ 10:17)  HR: 68 (11-24 @ 09:05) (65 - 89)  BP: 116/56 (11-24 @ 09:05) (113/50 - 142/59)  RR: 16 (11-24 @ 09:05) (12 - 20)  SpO2: 97% (11-24 @ 09:05) (93% - 100%)  11-23-21 @ 07:01  -  11-24-21 @ 07:00  --------------------------------------------------------  IN:  Total IN: 0 mL    OUT:    Indwelling Catheter - Urethral (mL): 2225 mL  Total OUT: 2225 mL    Total NET: -2225 mL    Physical Exam:  GEN: resting in bed comfortably in NAD  RESP: no increased WOB  ABD: soft, non-distended, appropriately tender to palpation around incisions only; dressings c/d/i  EXTR: warm, well-perfused without gross deformities; spontaneous movement in b/l U/L extrem  NEURO: awake, alert    Labs:             11.2   11.87 )-----------( 214      ( 24 Nov 2021 07:11 )             35.1     141  |  101  |  56<H>  ----------------------------<  183<H>  3.7   |  28  |  1.68<H>    Ca    9.7      24 Nov 2021 07:11  Phos  2.5     11-24  Mg     2.30     11-24    POCT Blood Glucose.: 170 mg/dL (24 Nov 2021 07:48)  POCT Blood Glucose.: 214 mg/dL (23 Nov 2021 23:04)  POCT Blood Glucose.: 223 mg/dL (23 Nov 2021 20:56)  POCT Blood Glucose.: 153 mg/dL (23 Nov 2021 11:01)      Medications:  MEDICATIONS  (STANDING):  acetaminophen     Tablet .. 975 milliGRAM(s) Oral every 6 hours  chlorhexidine 2% Cloths 1 Application(s) Topical daily  glucagon  Injectable 1 milliGRAM(s) IntraMuscular once  heparin   Injectable 5000 Unit(s) SubCutaneous every 8 hours  insulin lispro (ADMELOG) corrective regimen sliding scale   SubCutaneous three times a day before meals  lactated ringers. 1000 milliLiter(s) (30 mL/Hr) IV Continuous <Continuous>  lactated ringers. 1000 milliLiter(s) (125 mL/Hr) IV Continuous <Continuous>  potassium chloride    Tablet ER 20 milliEquivalent(s) Oral every 2 hours  potassium phosphate / sodium phosphate Powder (PHOS-NaK) 1 Packet(s) Oral once    MEDICATIONS  (PRN):  metoprolol tartrate Injectable 5 milliGRAM(s) IV Push every 6 hours PRN BP >160/110  oxyCODONE    IR 5 milliGRAM(s) Oral every 4 hours PRN Moderate Pain (4 - 6)

## 2021-11-24 NOTE — CONSULT NOTE ADULT - PROBLEM SELECTOR RECOMMENDATION 6
- based on documentation, pt w/ Cr 3.96 in 12/20, here 1.68, was 2.3 few weeks back at presurgical testing, continue to trend at this time, pt is non-oliguric - based on documentation, pt w/ Cr 3.96 in 12/20, here 1.68, was 2.3 few weeks back at presurgical testing, continue to trend at this time, pt is non-oliguric  - confirmed w/ outpatient cardiologist Cr 1.2-1.8 09/21, trend at this time

## 2021-11-24 NOTE — PROGRESS NOTE ADULT - ASSESSMENT
82yo F s/p TLH/BSO and umbilical hernia repair on 11/23, recovering well.     PLAN:   - Trend H/H  - Diet per Gyn Onc team  - Encourage OOB as tolerated   - Incentive spirometry 10x/hr while in bed   - Will follow with you    Sandra Mayer, PGY-2  A Team Surgery  #[id number]

## 2021-11-24 NOTE — PHYSICAL THERAPY INITIAL EVALUATION ADULT - LEVEL OF INDEPENDENCE: GAIT, REHAB EVAL
To be assessed; pt deferred sitting up @ edge of bed after Maximal encouragement 2/2 abdominal pain; Pt's family not encouraging

## 2021-11-24 NOTE — PHYSICAL THERAPY INITIAL EVALUATION ADULT - PATIENT PROFILE REVIEW, REHAB EVAL
ACTIVITY: Ambulate as Tolerated; Spoke with JERRY Willis prior to PT evaluation--> Pt OK for PT consult/OOB activity./yes

## 2021-11-24 NOTE — PROGRESS NOTE ADULT - NSPROGADDITIONALINFOA_GEN_ALL_CORE
Seen in f/u with F, R, PA. 2 Family members present (noted JK and my efforts to contact them after surgery). Labs and flow reviewed. Surgery and findings disc. Agree with plan; disc PT. Appreciate OS input. Dispo and instructions disc. Coverage discussed. All Q/A.

## 2021-11-24 NOTE — PROGRESS NOTE ADULT - ASSESSMENT
A/P: 81y  HD#/POD# s/p admitted with . Pt stable.   Neuro: pain well controlled on current regimen   CV: hemodynamically stable, H&H stable on AM labs  Pulm: O2 sat WNL on RA, increase ambulation, encourage incentive spirometry use  GI: tolerating regular diet  : UOP adequate, d/c ulrich  vs. voiding spontaneously   Heme: lovenox and SCDs while in bed for DVT ppx  ID: afebrile, no signs of infection  Endo: No active issues  Fe: Replete electrolytes as needed, LR@......  Dispo: continue routine post-op care    seen and d/w GYN Onc team  Malina Alejandro MD, PGY2 A/P: 81y POD#1 s/p . Pt stable.     Neuro: pain well controlled on current regimen Tylenol and Oxycodone   CV: hemodynamically stable, H&H stable. H/o HTN c/w Lopressor prn   Pulm: O2 sat WNL on RA, increase ambulation, encourage incentive spirometry use  GI: tolerating clear diet  : UOP adequate, ulrich in place  Heme: HSQ and SCDs while in bed for DVT ppx  ID: afebrile, no signs of infection  Endo: T2DM on ISS, f/u GHOS recs   Fe: Replete electrolytes as needed, LR@125  Dispo: continue routine post-op care, f/u PT consult    seen and d/w GYN Onc team  Malina Alejandro MD, PGY2 A/P: 81y POD#1 s/p RA TLH BSO sentinel LND, umbilical hernia repair . Pt stable this morning with no acute complaints.     Neuro: pain well controlled on current regimen Tylenol and Oxycodone   CV: hemodynamically stable, H&H stable. H/o HTN c/w Lopressor prn   Pulm: O2 sat WNL on RA, increase ambulation, encourage incentive spirometry use  GI: tolerating clear diet  : UOP adequate, ulrich in place  Heme: HSQ and SCDs while in bed for DVT ppx  ID: afebrile, no signs of infection  Endo: T2DM on ISS, f/u GHOS recs   Fe: Replete electrolytes as needed, LR@125  Dispo: continue routine post-op care, f/u PT consult    seen and d/w GYN Onc team  Malina Alejandro MD, PGY2

## 2021-11-24 NOTE — CONSULT NOTE ADULT - SUBJECTIVE AND OBJECTIVE BOX
CHIEF COMPLAINT: Patient is a 81y old  Female who presents with a chief complaint of     HPI: HPI:    81 yr old female DM, HTN, HLD, Sleep apnea, obesity, OA, knee disorder scheduled for Robotic Assisted Total Laparoscopic Hysterectomy B/L Salpingo oophorectomy Lymph Node Sampling and Umbilical Hernia Repair with Dr Yusuf and Dr Guillory tentatively 11/23/21 - pt is poor historian and [place] - states she had abnormal vaginal bleeding and lower abdominal pain 2-3 months ago - biopsy positive and pt referred for surgery. 80 yo  postmenopausal female who presents for initial consultation at the request of Dr. Vásquez for the management of endometrial cancer. Reports PMB over the past several months. She reports a lapse in GYN care since the birth of her children. PAP revealed atypical glandular cells. EMBx revealed grade 1 endometrial cancer. She was referred here for further management.       Pain Symptoms if applicable:             	                           none	    mild         moderate         severe  	                            0	     1-3	      4-6	          7-10  Pain:  Location:	  Modifying factors:	  Associated symptoms:	    Allergies    No Known Allergies    Intolerances        HOME MEDICATIONS: [x] Reviewed    Aspirin Low Dose 81 MG TABS  Atorvastatin Calcium 20 MG Oral Tablet  Carvedilol 12.5 MG Oral Tablet  Gabapentin 300 MG Oral Capsule  Glimepiride 2 MG Oral Tablet  Isosorbide Dinitrate 20 MG Oral Tablet  Multiple Vitamin TABS  Potassium Chloride ER 10 MEQ Oral Capsule Extended Release  Torsemide 20 MG Oral Tablet      MEDICATIONS  (STANDING):  acetaminophen     Tablet .. 975 milliGRAM(s) Oral every 6 hours  chlorhexidine 2% Cloths 1 Application(s) Topical daily  glucagon  Injectable 1 milliGRAM(s) IntraMuscular once  heparin   Injectable 5000 Unit(s) SubCutaneous every 8 hours  insulin lispro (ADMELOG) corrective regimen sliding scale   SubCutaneous three times a day before meals  lactated ringers. 1000 milliLiter(s) (30 mL/Hr) IV Continuous <Continuous>  lactated ringers. 1000 milliLiter(s) (125 mL/Hr) IV Continuous <Continuous>  potassium chloride    Tablet ER 20 milliEquivalent(s) Oral every 2 hours  potassium phosphate / sodium phosphate Powder (PHOS-NaK) 1 Packet(s) Oral once    MEDICATIONS  (PRN):  metoprolol tartrate Injectable 5 milliGRAM(s) IV Push every 6 hours PRN BP >160/110  oxyCODONE    IR 5 milliGRAM(s) Oral every 4 hours PRN Moderate Pain (4 - 6)      PAST MEDICAL & SURGICAL HISTORY:  Obesity    Endometrial ca    Umbilical hernia    Sleep apnea    CAD (coronary artery disease)    H/O neuropathy    HTN (hypertension)    HLD (hyperlipidemia)    DM (diabetes mellitus)    Disorder of knee  left tendon injury    H/O total knee replacement  right and left    [ ] Reviewed     SOCIAL HISTORY:  [x] Substance abuse:   [x] Tobacco:   [x] Alcohol use:     FAMILY HISTORY:  [x] No pertinent family history in first degree relatives     REVIEW OF SYSTEMS:    [x] All other ROS negative  [  ] Unable to obtain due to poor mental status    Vital Signs Last 24 Hrs  T(C): 37 (24 Nov 2021 06:24), Max: 37 (23 Nov 2021 10:17)  T(F): 98.6 (24 Nov 2021 06:24), Max: 98.6 (23 Nov 2021 10:17)  HR: 65 (24 Nov 2021 06:24) (65 - 89)  BP: 136/61 (24 Nov 2021 06:24) (113/50 - 142/59)  BP(mean): 79 (23 Nov 2021 23:00) (77 - 87)  RR: 17 (24 Nov 2021 06:24) (12 - 20)  SpO2: 95% (24 Nov 2021 06:24) (93% - 100%)    PHYSICAL EXAM:    GENERAL: NAD, well-groomed, well-developed  HEAD:  Atraumatic, Normocephalic  EYES: EOMI, PERRLA, conjunctiva and sclera clear  ENMT: Moist mucous membranes  NECK: Supple, No JVD  RESPIRATORY: Clear to auscultation bilaterally; No rales, rhonchi, wheezing, or rubs  CARDIOVASCULAR: Regular rate and rhythm; No murmurs, rubs, or gallops  GASTROINTESTINAL: Soft, Nontender, Nondistended; Bowel sounds present  GENITOURINARY: Not examined  EXTREMITIES:  2+ Peripheral Pulses, No clubbing, cyanosis, or edema  NERVOUS SYSTEM:  Alert & Oriented X3; Moving all 4 extremities; No gross sensory deficits  HEME/LYMPH: No lymphadenopathy noted  SKIN: No rashes or lesions; Incisions C/D/I    LABS:                        11.2   11.87 )-----------( 214      ( 24 Nov 2021 07:11 )             35.1     11-24    141  |  101  |  56<H>  ----------------------------<  183<H>  3.7   |  28  |  1.68<H>    Ca    9.7      24 Nov 2021 07:11  Phos  2.5     11-24  Mg     2.30     11-24          CAPILLARY BLOOD GLUCOSE      POCT Blood Glucose.: 170 mg/dL (24 Nov 2021 07:48)      RADIOLOGY & ADDITIONAL STUDIES:    EKG:   Personally Reviewed:  [x] YES     Imaging:   Personally Reviewed:  [x] YES               [ ] Consultant(s) Notes Reviewed  [x] Care Discussed with Consultants/Other Providers: Urology PA - discussed CHIEF COMPLAINT: Patient is a 81y old  Female who presents with a chief complaint of     HPI: HPI:    81 yr old female Heart failure, T2DM, HTN, HLD, Sleep apnea, obesity, OA, knee disorder admitted for scheduled for Robotic Assisted Total Laparoscopic Hysterectomy B/L Salpingo oophorectomy Lymph Node Sampling and Umbilical Hernia Repair. Per documentation patient reported having history of vaginal bleeding, endometrial biopsy positive for cancer, grade 1 endometrial cancer. She presented here for scheduled CURT, BLSO and lymph node sampling. At this time pt reports mild abdominal pain, no CP, no dyspnea, no N/V. She is tolerating po intake, still needs to have large BM.      Pain Symptoms if applicable:             	                           none	    mild         moderate         severe  	                            0	     1-3	      4-6	          7-10  Pain:  Location:	  Modifying factors:	  Associated symptoms:	    Allergies    No Known Allergies    Intolerances        HOME MEDICATIONS: [x] Reviewed    Aspirin Low Dose 81 MG TABS  Atorvastatin Calcium 20 MG Oral Tablet  Carvedilol 12.5 MG Oral Tablet  Gabapentin 300 MG Oral Capsule  Glimepiride 2 MG Oral Tablet  Isosorbide Dinitrate 20 MG Oral Tablet  Multiple Vitamin TABS  Potassium Chloride ER 10 MEQ Oral Capsule Extended Release  Torsemide 20 MG Oral Tablet  Entresto 49-51 bid      MEDICATIONS  (STANDING):  acetaminophen     Tablet .. 975 milliGRAM(s) Oral every 6 hours  chlorhexidine 2% Cloths 1 Application(s) Topical daily  glucagon  Injectable 1 milliGRAM(s) IntraMuscular once  heparin   Injectable 5000 Unit(s) SubCutaneous every 8 hours  insulin lispro (ADMELOG) corrective regimen sliding scale   SubCutaneous three times a day before meals  lactated ringers. 1000 milliLiter(s) (30 mL/Hr) IV Continuous <Continuous>  lactated ringers. 1000 milliLiter(s) (125 mL/Hr) IV Continuous <Continuous>  potassium chloride    Tablet ER 20 milliEquivalent(s) Oral every 2 hours  potassium phosphate / sodium phosphate Powder (PHOS-NaK) 1 Packet(s) Oral once    MEDICATIONS  (PRN):  metoprolol tartrate Injectable 5 milliGRAM(s) IV Push every 6 hours PRN BP >160/110  oxyCODONE    IR 5 milliGRAM(s) Oral every 4 hours PRN Moderate Pain (4 - 6)      PAST MEDICAL & SURGICAL HISTORY:  Obesity    Endometrial ca    Umbilical hernia    Sleep apnea    CAD (coronary artery disease)    H/O neuropathy    HTN (hypertension)    HLD (hyperlipidemia)    DM (diabetes mellitus)    Disorder of knee  left tendon injury    H/O total knee replacement  right and left    [ ] Reviewed     SOCIAL HISTORY:  [x] Substance abuse:   [x] Tobacco:   [x] Alcohol use:     FAMILY HISTORY:  [x] No pertinent family history in first degree relatives     REVIEW OF SYSTEMS:    [x] All other ROS negative  [  ] Unable to obtain due to poor mental status    Vital Signs Last 24 Hrs  T(C): 37 (24 Nov 2021 06:24), Max: 37 (23 Nov 2021 10:17)  T(F): 98.6 (24 Nov 2021 06:24), Max: 98.6 (23 Nov 2021 10:17)  HR: 65 (24 Nov 2021 06:24) (65 - 89)  BP: 136/61 (24 Nov 2021 06:24) (113/50 - 142/59)  BP(mean): 79 (23 Nov 2021 23:00) (77 - 87)  RR: 17 (24 Nov 2021 06:24) (12 - 20)  SpO2: 95% (24 Nov 2021 06:24) (93% - 100%)    PHYSICAL EXAM:    GENERAL: NAD, well-groomed, well-developed  HEAD:  Atraumatic, Normocephalic  EYES: EOMI, PERRLA, conjunctiva and sclera clear  ENMT: Moist mucous membranes  NECK: Supple, No JVD  RESPIRATORY: Clear to auscultation bilaterally; No rales, rhonchi, wheezing, or rubs  CARDIOVASCULAR: Regular rate and rhythm; No murmurs, rubs, or gallops  GASTROINTESTINAL: Soft, Nontender, Nondistended; Bowel sounds present  GENITOURINARY: Not examined  EXTREMITIES:  2+ Peripheral Pulses, No clubbing, cyanosis, No pitting edema.  NERVOUS SYSTEM:  Alert & Oriented X3; Moving all 4 extremities; No gross sensory deficits  HEME/LYMPH: No lymphadenopathy noted  SKIN: No rashes or lesions; Incisions C/D/I    LABS:                        11.2   11.87 )-----------( 214      ( 24 Nov 2021 07:11 )             35.1     11-24    141  |  101  |  56<H>  ----------------------------<  183<H>  3.7   |  28  |  1.68<H>    Ca    9.7      24 Nov 2021 07:11  Phos  2.5     11-24  Mg     2.30     11-24          CAPILLARY BLOOD GLUCOSE      POCT Blood Glucose.: 170 mg/dL (24 Nov 2021 07:48)      RADIOLOGY & ADDITIONAL STUDIES:    EKG:   Personally Reviewed:  [x] YES     Imaging:   Personally Reviewed:  [x] YES               [ ] Consultant(s) Notes Reviewed  [x] Care Discussed with Consultants/Other Providers: Urology PA - discussed CHIEF COMPLAINT: Patient is a 81y old  Female who presents with a chief complaint of     HPI: HPI:    81 yr old female Heart failure, T2DM, HTN, HLD, Sleep apnea, obesity, OA, knee disorder admitted for scheduled for Robotic Assisted Total Laparoscopic Hysterectomy B/L Salpingo oophorectomy Lymph Node Sampling and Umbilical Hernia Repair. Per documentation patient reported having history of vaginal bleeding, endometrial biopsy positive for cancer, grade 1 endometrial cancer. She presented here for scheduled CURT, BLSO and lymph node sampling. At this time pt reports mild abdominal pain, no CP, no dyspnea, no N/V. She is tolerating po intake, still needs to have large BM.      Pain Symptoms if applicable:             	                           none	    mild         moderate         severe  	                            0	     1-3	      4-6	          7-10  Pain:  Location:	  Modifying factors:	  Associated symptoms:	    Allergies    No Known Allergies    Intolerances        HOME MEDICATIONS: [x] Reviewed    Aspirin Low Dose 81 MG TABS  Atorvastatin Calcium 20 MG Oral Tablet  Carvedilol 12.5 MG Oral Tablet  Gabapentin 300 MG Oral Capsule  Glimepiride 2 MG Oral Tablet  Isosorbide Dinitrate 20 MG Oral Tablet  Multiple Vitamin TABS  Potassium Chloride ER 10 MEQ Oral Capsule Extended Release  Torsemide 20 MG Oral Tablet  Entresto 49-51 bid      MEDICATIONS  (STANDING):  acetaminophen     Tablet .. 975 milliGRAM(s) Oral every 6 hours  chlorhexidine 2% Cloths 1 Application(s) Topical daily  glucagon  Injectable 1 milliGRAM(s) IntraMuscular once  heparin   Injectable 5000 Unit(s) SubCutaneous every 8 hours  insulin lispro (ADMELOG) corrective regimen sliding scale   SubCutaneous three times a day before meals  lactated ringers. 1000 milliLiter(s) (30 mL/Hr) IV Continuous <Continuous>  lactated ringers. 1000 milliLiter(s) (125 mL/Hr) IV Continuous <Continuous>  potassium chloride    Tablet ER 20 milliEquivalent(s) Oral every 2 hours  potassium phosphate / sodium phosphate Powder (PHOS-NaK) 1 Packet(s) Oral once    MEDICATIONS  (PRN):  metoprolol tartrate Injectable 5 milliGRAM(s) IV Push every 6 hours PRN BP >160/110  oxyCODONE    IR 5 milliGRAM(s) Oral every 4 hours PRN Moderate Pain (4 - 6)      PAST MEDICAL & SURGICAL HISTORY:  Obesity    Endometrial ca    Umbilical hernia    Sleep apnea    CAD (coronary artery disease)    H/O neuropathy    HTN (hypertension)    HLD (hyperlipidemia)    DM (diabetes mellitus)    Disorder of knee  left tendon injury    H/O total knee replacement  right and left    [ ] Reviewed     SOCIAL HISTORY:  [x] Substance abuse: None  [x] Tobacco: None  [x] Alcohol use: None    FAMILY HISTORY:  [x] No pertinent family history in first degree relatives     REVIEW OF SYSTEMS:    [x] All other ROS negative  [  ] Unable to obtain due to poor mental status    Vital Signs Last 24 Hrs  T(C): 37 (24 Nov 2021 06:24), Max: 37 (23 Nov 2021 10:17)  T(F): 98.6 (24 Nov 2021 06:24), Max: 98.6 (23 Nov 2021 10:17)  HR: 65 (24 Nov 2021 06:24) (65 - 89)  BP: 136/61 (24 Nov 2021 06:24) (113/50 - 142/59)  BP(mean): 79 (23 Nov 2021 23:00) (77 - 87)  RR: 17 (24 Nov 2021 06:24) (12 - 20)  SpO2: 95% (24 Nov 2021 06:24) (93% - 100%)    PHYSICAL EXAM:    GENERAL: NAD, well-groomed, well-developed  HEAD:  Atraumatic, Normocephalic  EYES: EOMI, PERRLA, conjunctiva and sclera clear  ENMT: Moist mucous membranes  NECK: Supple, No JVD  RESPIRATORY: Clear to auscultation bilaterally; No rales, rhonchi, wheezing, or rubs  CARDIOVASCULAR: Regular rate and rhythm; No murmurs, rubs, or gallops  GASTROINTESTINAL: Soft, Nontender, Nondistended; Bowel sounds present  GENITOURINARY: Not examined  EXTREMITIES:  2+ Peripheral Pulses, No clubbing, cyanosis, No pitting edema.  NERVOUS SYSTEM:  Alert & Oriented X3; Moving all 4 extremities; No gross sensory deficits  HEME/LYMPH: No lymphadenopathy noted  SKIN: No rashes or lesions; Incisions C/D/I    LABS:                        11.2   11.87 )-----------( 214      ( 24 Nov 2021 07:11 )             35.1     11-24    141  |  101  |  56<H>  ----------------------------<  183<H>  3.7   |  28  |  1.68<H>    Ca    9.7      24 Nov 2021 07:11  Phos  2.5     11-24  Mg     2.30     11-24          CAPILLARY BLOOD GLUCOSE      POCT Blood Glucose.: 170 mg/dL (24 Nov 2021 07:48)      RADIOLOGY & ADDITIONAL STUDIES:    EKG:   Personally Reviewed:  [x] YES     Imaging:   Personally Reviewed:  [x] YES               [ ] Consultant(s) Notes Reviewed  [x] Care Discussed with Consultants/Other Providers: Urology PA - discussed

## 2021-11-24 NOTE — PATIENT PROFILE ADULT - ABILITY TO HEAR (WITH HEARING AID OR HEARING APPLIANCE IF NORMALLY USED):
patient has hearing aid in left ear/Mildly to Moderately Impaired: difficulty hearing in some environments or speaker may need to increase volume or speak distinctly

## 2021-11-24 NOTE — PHYSICAL THERAPY INITIAL EVALUATION ADULT - PRECAUTIONS/LIMITATIONS, REHAB EVAL
[place]/fall precautions/surgical precautions [place]/fall precautions/obesity precautions/surgical precautions

## 2021-11-24 NOTE — CONSULT NOTE ADULT - ASSESSMENT
81 yr old female Heart Failure, T2DM, HTN, HLD, Sleep apnea, obesity, OA, knee disorder s/p Robotic Assisted Total Laparoscopic Hysterectomy B/L Salpingo oophorectomy Lymph Node Sampling and Umbilical Hernia Repair POD#1

## 2021-11-24 NOTE — PHYSICAL THERAPY INITIAL EVALUATION ADULT - ADDITIONAL COMMENTS
Pt reports that she lives alone in a private house with 3 steps to enter from the garage to the 1st floor; (+)bilateral handrails; and ~5 steps to negotiate to bedroom (+)bilateral  handrails. Prior to hospital admission pt was ambulating independently using a rolling walker with bilateral Leg braces. Pt has a home health aide 7 days/week 8hours/day. Pt's last fall was ~3 months ago.    Pt left comfortable in bed, NAD, all lines Intact, all precautions maintained, with call bell in reach, bed alarm on, family @bedside, and RN aware of PT evaluation and pt's pain.

## 2021-11-24 NOTE — PHYSICAL THERAPY INITIAL EVALUATION ADULT - DIAGNOSIS, PT EVAL
Pt s/p CURT, BLSO and lymph node sampling on 11/23/2021; pt presents with decreased strength and decreased functional mobility.

## 2021-11-24 NOTE — PHYSICAL THERAPY INITIAL EVALUATION ADULT - PERTINENT HX OF CURRENT PROBLEM, REHAB EVAL
81 yr old female Heart failure, T2DM, HTN, HLD, Sleep apnea, obesity, OA, knee disorder admitted for scheduled for Robotic Assisted Total Laparoscopic Hysterectomy B/L Salpingo oophorectomy Lymph Node Sampling and Umbilical Hernia Repair. Per documentation patient reported having history of vaginal bleeding, endometrial biopsy positive for cancer, grade 1 endometrial cancer. She presented here for scheduled CURT, BLSO and lymph node sampling. 81 yr old female Heart failure, T2DM, HTN, HLD, Sleep apnea, obesity, OA, knee disorder admitted for scheduled for Robotic Assisted Total Laparoscopic Hysterectomy Bilateral Salpingo oophorectomy Lymph Node Sampling and Umbilical Hernia Repair. Per documentation patient reported having history of vaginal bleeding, endometrial biopsy positive for cancer, grade 1 endometrial cancer. She presented here for scheduled CURT, BLSO and lymph node sampling.

## 2021-11-24 NOTE — CONSULT NOTE ADULT - PROBLEM SELECTOR RECOMMENDATION 5
- d/w outpatient cardiologist Dr. Leon [phone], started on entresto from recent admission at [facility], EF normal per outpatient cardiologist  - at this time patient w/o signs of volume overload, and given Cr improving w/o diuresis would hold home torsemide/metolazone  - c/w entresto (sacubitril 49/valsartain 51)  - c/w home coreg 12.5 mg bid

## 2021-11-24 NOTE — PHYSICAL THERAPY INITIAL EVALUATION ADULT - MANUAL MUSCLE TESTING RESULTS, REHAB EVAL
surgical precautions; bilateral UE at least 3+/5, Left Hamstring 3-/5, left quads 2/5, left anterior tibialis 3/5, Right hamstring 3/5, right quads 3-/5, right anterior tibialis 3/5/grossly assessed due to

## 2021-11-25 LAB
A1C WITH ESTIMATED AVERAGE GLUCOSE RESULT: 6.9 % — HIGH (ref 4–5.6)
ANION GAP SERPL CALC-SCNC: 8 MMOL/L — SIGNIFICANT CHANGE UP (ref 7–14)
BASOPHILS # BLD AUTO: 0.04 K/UL — SIGNIFICANT CHANGE UP (ref 0–0.2)
BASOPHILS NFR BLD AUTO: 0.5 % — SIGNIFICANT CHANGE UP (ref 0–2)
BUN SERPL-MCNC: 40 MG/DL — HIGH (ref 7–23)
CALCIUM SERPL-MCNC: 9.6 MG/DL — SIGNIFICANT CHANGE UP (ref 8.4–10.5)
CHLORIDE SERPL-SCNC: 103 MMOL/L — SIGNIFICANT CHANGE UP (ref 98–107)
CO2 SERPL-SCNC: 28 MMOL/L — SIGNIFICANT CHANGE UP (ref 22–31)
CREAT SERPL-MCNC: 1.5 MG/DL — HIGH (ref 0.5–1.3)
EOSINOPHIL # BLD AUTO: 0.22 K/UL — SIGNIFICANT CHANGE UP (ref 0–0.5)
EOSINOPHIL NFR BLD AUTO: 2.5 % — SIGNIFICANT CHANGE UP (ref 0–6)
ESTIMATED AVERAGE GLUCOSE: 151 — SIGNIFICANT CHANGE UP
GLUCOSE BLDC GLUCOMTR-MCNC: 128 MG/DL — HIGH (ref 70–99)
GLUCOSE BLDC GLUCOMTR-MCNC: 130 MG/DL — HIGH (ref 70–99)
GLUCOSE BLDC GLUCOMTR-MCNC: 151 MG/DL — HIGH (ref 70–99)
GLUCOSE BLDC GLUCOMTR-MCNC: 167 MG/DL — HIGH (ref 70–99)
GLUCOSE SERPL-MCNC: 142 MG/DL — HIGH (ref 70–99)
HCT VFR BLD CALC: 32.9 % — LOW (ref 34.5–45)
HGB BLD-MCNC: 10.2 G/DL — LOW (ref 11.5–15.5)
IANC: 5.32 K/UL — SIGNIFICANT CHANGE UP (ref 1.5–8.5)
IMM GRANULOCYTES NFR BLD AUTO: 0.3 % — SIGNIFICANT CHANGE UP (ref 0–1.5)
LYMPHOCYTES # BLD AUTO: 2.11 K/UL — SIGNIFICANT CHANGE UP (ref 1–3.3)
LYMPHOCYTES # BLD AUTO: 24.3 % — SIGNIFICANT CHANGE UP (ref 13–44)
MAGNESIUM SERPL-MCNC: 2.4 MG/DL — SIGNIFICANT CHANGE UP (ref 1.6–2.6)
MCHC RBC-ENTMCNC: 28.3 PG — SIGNIFICANT CHANGE UP (ref 27–34)
MCHC RBC-ENTMCNC: 31 GM/DL — LOW (ref 32–36)
MCV RBC AUTO: 91.4 FL — SIGNIFICANT CHANGE UP (ref 80–100)
MONOCYTES # BLD AUTO: 0.95 K/UL — HIGH (ref 0–0.9)
MONOCYTES NFR BLD AUTO: 11 % — SIGNIFICANT CHANGE UP (ref 2–14)
NEUTROPHILS # BLD AUTO: 5.32 K/UL — SIGNIFICANT CHANGE UP (ref 1.8–7.4)
NEUTROPHILS NFR BLD AUTO: 61.4 % — SIGNIFICANT CHANGE UP (ref 43–77)
NRBC # BLD: 0 /100 WBCS — SIGNIFICANT CHANGE UP
NRBC # FLD: 0 K/UL — SIGNIFICANT CHANGE UP
PHOSPHATE SERPL-MCNC: 2.1 MG/DL — LOW (ref 2.5–4.5)
PLATELET # BLD AUTO: 193 K/UL — SIGNIFICANT CHANGE UP (ref 150–400)
POTASSIUM SERPL-MCNC: 4.2 MMOL/L — SIGNIFICANT CHANGE UP (ref 3.5–5.3)
POTASSIUM SERPL-SCNC: 4.2 MMOL/L — SIGNIFICANT CHANGE UP (ref 3.5–5.3)
RBC # BLD: 3.6 M/UL — LOW (ref 3.8–5.2)
RBC # FLD: 14.4 % — SIGNIFICANT CHANGE UP (ref 10.3–14.5)
SODIUM SERPL-SCNC: 139 MMOL/L — SIGNIFICANT CHANGE UP (ref 135–145)
WBC # BLD: 8.67 K/UL — SIGNIFICANT CHANGE UP (ref 3.8–10.5)
WBC # FLD AUTO: 8.67 K/UL — SIGNIFICANT CHANGE UP (ref 3.8–10.5)

## 2021-11-25 PROCEDURE — 99232 SBSQ HOSP IP/OBS MODERATE 35: CPT

## 2021-11-25 RX ORDER — POTASSIUM PHOSPHATE, MONOBASIC POTASSIUM PHOSPHATE, DIBASIC 236; 224 MG/ML; MG/ML
15 INJECTION, SOLUTION INTRAVENOUS ONCE
Refills: 0 | Status: COMPLETED | OUTPATIENT
Start: 2021-11-25 | End: 2021-11-25

## 2021-11-25 RX ORDER — SODIUM,POTASSIUM PHOSPHATES 278-250MG
1 POWDER IN PACKET (EA) ORAL ONCE
Refills: 0 | Status: COMPLETED | OUTPATIENT
Start: 2021-11-25 | End: 2021-11-25

## 2021-11-25 RX ADMIN — Medication 1 TABLET(S): at 15:00

## 2021-11-25 RX ADMIN — HEPARIN SODIUM 5000 UNIT(S): 5000 INJECTION INTRAVENOUS; SUBCUTANEOUS at 21:56

## 2021-11-25 RX ADMIN — Medication 975 MILLIGRAM(S): at 15:00

## 2021-11-25 RX ADMIN — Medication 975 MILLIGRAM(S): at 20:04

## 2021-11-25 RX ADMIN — Medication 81 MILLIGRAM(S): at 12:37

## 2021-11-25 RX ADMIN — Medication 975 MILLIGRAM(S): at 09:40

## 2021-11-25 RX ADMIN — OXYCODONE HYDROCHLORIDE 5 MILLIGRAM(S): 5 TABLET ORAL at 09:40

## 2021-11-25 RX ADMIN — Medication 975 MILLIGRAM(S): at 08:51

## 2021-11-25 RX ADMIN — OXYCODONE HYDROCHLORIDE 5 MILLIGRAM(S): 5 TABLET ORAL at 08:58

## 2021-11-25 RX ADMIN — HEPARIN SODIUM 5000 UNIT(S): 5000 INJECTION INTRAVENOUS; SUBCUTANEOUS at 06:05

## 2021-11-25 RX ADMIN — Medication 975 MILLIGRAM(S): at 20:45

## 2021-11-25 RX ADMIN — Medication 975 MILLIGRAM(S): at 02:07

## 2021-11-25 RX ADMIN — HEPARIN SODIUM 5000 UNIT(S): 5000 INJECTION INTRAVENOUS; SUBCUTANEOUS at 15:01

## 2021-11-25 NOTE — PROGRESS NOTE ADULT - ASSESSMENT
82yo F s/p TLH/BSO and umbilical hernia repair on 11/23, recovering well.    -Regular diet  -Pain control  -Appreciate GYN/ONC care      [facility]  #[id number]

## 2021-11-25 NOTE — PROGRESS NOTE ADULT - ASSESSMENT
81F Heart Failure, T2DM, HTN, HLD, Sleep apnea, obesity, OA, knee disorder, 11/23 underwent Robotic Assisted Total Laparoscopic Hysterectomy B/L Salpingo oophorectomy Lymph Node Sampling and Umbilical Hernia Repair

## 2021-11-25 NOTE — PROGRESS NOTE ADULT - SUBJECTIVE AND OBJECTIVE BOX
[facility] Division of Hospital Medicine  Kelly Camargo MD  Pager (M-F, 8A-5P):  In-house pager [phone]; Long-range pager [phone]  Other Times:  Please page Hospitalist in Charge -  In-house pager [phone]    Patient is a 81y old  Female who presents with a chief complaint of     SUBJECTIVE / OVERNIGHT EVENTS:  Pt seen earlier, resting in recliner chair. Comfortable, denies pain/SOB/palpitations.    ADDITIONAL REVIEW OF SYSTEMS:    MEDICATIONS  (STANDING):  acetaminophen     Tablet .. 975 milliGRAM(s) Oral every 6 hours  aspirin enteric coated 81 milliGRAM(s) Oral daily  carvedilol 12.5 milliGRAM(s) Oral every 12 hours  glucagon  Injectable 1 milliGRAM(s) IntraMuscular once  heparin   Injectable 5000 Unit(s) SubCutaneous every 8 hours  insulin lispro (ADMELOG) corrective regimen sliding scale   SubCutaneous three times a day before meals  sacubitril 49 mG/valsartan 51 mG 1 Tablet(s) Oral two times a day    MEDICATIONS  (PRN):  oxyCODONE    IR 5 milliGRAM(s) Oral every 4 hours PRN Moderate Pain (4 - 6)    CAPILLARY BLOOD GLUCOSE  POCT Blood Glucose.: 130 mg/dL (25 Nov 2021 17:03)  POCT Blood Glucose.: 151 mg/dL (25 Nov 2021 11:43)  POCT Blood Glucose.: 128 mg/dL (25 Nov 2021 08:17)  POCT Blood Glucose.: 155 mg/dL (24 Nov 2021 21:23)    I&O's Summary    24 Nov 2021 07:01  -  25 Nov 2021 07:00  --------------------------------------------------------  IN: 1930 mL / OUT: 1700 mL / NET: 230 mL    25 Nov 2021 07:01  -  25 Nov 2021 17:49  --------------------------------------------------------  IN: 0 mL / OUT: 200 mL / NET: -200 mL    PHYSICAL EXAM:  Vital Signs Last 24 Hrs  T(C): 36.8 (25 Nov 2021 17:15), Max: 36.9 (24 Nov 2021 18:00)  T(F): 98.2 (25 Nov 2021 17:15), Max: 98.4 (24 Nov 2021 18:00)  HR: 67 (25 Nov 2021 17:15) (60 - 72)  BP: 105/53 (25 Nov 2021 17:15) (105/53 - 132/68)  BP(mean): --  RR: 20 (25 Nov 2021 17:15) (17 - 20)  SpO2: 100% (25 Nov 2021 17:15) (96% - 100%)    CONSTITUTIONAL: obese F, lying in recliner chair, looks comfortable  RESPIRATORY: dec BS bases  CARDIOVASCULAR: Regular rate and rhythm, normal S1 and S2, no murmur/rub/gallop; No lower extremity edema; Peripheral pulses are 2+ bilaterally  ABDOMEN: obese, incisions covered, ND, soft, NT  MUSCLOSKELETAL:  no clubbing or cyanosis of digits; no joint swelling or tenderness to palpation  PSYCH: A+O to person, place, and time; affect appropriate  NEUROLOGY: nonfocal   SKIN: No rashes; no palpable lesions    LABS:                        10.2   8.67  )-----------( 193      ( 25 Nov 2021 06:21 )             32.9     11-25    139  |  103  |  40<H>  ----------------------------<  142<H>  4.2   |  28  |  1.50<H>    Ca    9.6      25 Nov 2021 06:21  Phos  2.1     11-25  Mg     2.40     11-25    RADIOLOGY & ADDITIONAL TESTS:  Results Reviewed:   Imaging Personally Reviewed:  Electrocardiogram Personally Reviewed:    COORDINATION OF CARE:  Care Discussed with Consultants/Other Providers [Y/N]: GYN onc re overall care  Prior or Outpatient Records Reviewed [Y/N]:

## 2021-11-25 NOTE — PROGRESS NOTE ADULT - SUBJECTIVE AND OBJECTIVE BOX
POD # 2    Pt seen and examined at bedside. No overnight events.  Pt without complaints. Pain well controlled on current regimen.   She denies SOB/CP/palpitations, fever/chills, nausea/emesis. Tolerating regular diet.  +OOB,  +Flatus, +Hinson in situ    MEDICATIONS  (STANDING):  acetaminophen     Tablet .. 975 milliGRAM(s) Oral every 6 hours  aspirin enteric coated 81 milliGRAM(s) Oral daily  carvedilol 12.5 milliGRAM(s) Oral every 12 hours  glucagon  Injectable 1 milliGRAM(s) IntraMuscular once  heparin   Injectable 5000 Unit(s) SubCutaneous every 8 hours  insulin lispro (ADMELOG) corrective regimen sliding scale   SubCutaneous three times a day before meals  sacubitril 49 mG/valsartan 51 mG 1 Tablet(s) Oral two times a day    MEDICATIONS  (PRN):  oxyCODONE    IR 5 milliGRAM(s) Oral every 4 hours PRN Moderate Pain (4 - 6)        Vital Signs Last 24 Hrs  T(C): 36.9 (25 Nov 2021 02:00), Max: 37 (24 Nov 2021 06:24)  T(F): 98.4 (25 Nov 2021 02:00), Max: 98.6 (24 Nov 2021 06:24)  HR: 64 (25 Nov 2021 02:00) (62 - 71)  BP: 120/58 (25 Nov 2021 02:00) (113/52 - 136/61)  BP(mean): --  RR: 18 (25 Nov 2021 02:00) (16 - 19)  SpO2: 100% (25 Nov 2021 02:00) (95% - 100%)    11-23 @ 07:01  -  11-24 @ 07:00  --------------------------------------------------------  IN: 1850 mL / OUT: 2225 mL / NET: -375 mL    11-24 @ 07:01  -  11-25 @ 05:11  --------------------------------------------------------  IN: 1730 mL / OUT: 1500 mL / NET: 230 mL          Physical Exam:  Constitutional: NAD, A+O x3  CV: RRR  Lungs: clear to auscultation bilaterally  Abdomen: soft, nondistended, no guarding, no rebound, normal bowel sounds  Incision: laparoscopic and umbilical incision site clean, dry, intact  Extremities: no lower extremity edema or calf tenderness bilaterally; venodynes in place    Labs, additional tests:             11.2   11.87 )-----------( 214      ( 11-24 @ 07:11 )             35.1                11.8   9.38  )-----------( 202      ( 11-23 @ 20:31 )             35.9       11-24    141  |  101  |  56<H>  ----------------------------<  183<H>  3.7   |  28  |  1.68<H>    Ca    9.7      24 Nov 2021 07:11  Phos  2.5     11-24  Mg     2.30     11-24         POD # 2    Pt seen and examined at bedside. No overnight events.  Pt without complaints. Pain well controlled on current regimen.   She denies SOB/CP/palpitations, fever/chills, nausea/emesis. Tolerating regular diet.  +OOB,  +Flatus, +Hinson in situ    MEDICATIONS  (STANDING):  acetaminophen     Tablet .. 975 milliGRAM(s) Oral every 6 hours  aspirin enteric coated 81 milliGRAM(s) Oral daily  carvedilol 12.5 milliGRAM(s) Oral every 12 hours  glucagon  Injectable 1 milliGRAM(s) IntraMuscular once  heparin   Injectable 5000 Unit(s) SubCutaneous every 8 hours  insulin lispro (ADMELOG) corrective regimen sliding scale   SubCutaneous three times a day before meals  sacubitril 49 mG/valsartan 51 mG 1 Tablet(s) Oral two times a day    MEDICATIONS  (PRN):  oxyCODONE    IR 5 milliGRAM(s) Oral every 4 hours PRN Moderate Pain (4 - 6)        Vital Signs Last 24 Hrs  T(C): 36.9 (25 Nov 2021 02:00), Max: 37 (24 Nov 2021 06:24)  T(F): 98.4 (25 Nov 2021 02:00), Max: 98.6 (24 Nov 2021 06:24)  HR: 64 (25 Nov 2021 02:00) (62 - 71)  BP: 120/58 (25 Nov 2021 02:00) (113/52 - 136/61)  BP(mean): --  RR: 18 (25 Nov 2021 02:00) (16 - 19)  SpO2: 100% (25 Nov 2021 02:00) (95% - 100%)    11-23 @ 07:01  -  11-24 @ 07:00  --------------------------------------------------------  IN: 1850 mL / OUT: 2225 mL / NET: -375 mL    11-24 @ 07:01  -  11-25 @ 05:11  --------------------------------------------------------  IN: 1730 mL / OUT: 1500 mL / NET: 230 mL          Physical Exam:  Constitutional: NAD, A+O x3  CV: RRR  Lungs: clear to auscultation bilaterally  Abdomen: soft, nondistended, no guarding, no rebound, normal bowel sounds  Incision: laparoscopic and umbilical incision site clean, dry, intact  Extremities: sym b/l LE edema (per pt baseline), no overlaying skin changes or calf tenderness bilaterally; venodynes in place    Labs, additional tests:             11.2   11.87 )-----------( 214      ( 11-24 @ 07:11 )             35.1                11.8   9.38  )-----------( 202      ( 11-23 @ 20:31 )             35.9       11-24    141  |  101  |  56<H>  ----------------------------<  183<H>  3.7   |  28  |  1.68<H>    Ca    9.7      24 Nov 2021 07:11  Phos  2.5     11-24  Mg     2.30     11-24

## 2021-11-25 NOTE — PROGRESS NOTE ADULT - ASSESSMENT
A/P: 81y POD#2 s/p RA TLH BSO sentinel LND, umbilical hernia repair . Pt stable this morning with no acute complaints.     Neuro: pain well controlled on current regimen Tylenol and Oxycodone   CV: hemodynamically stable, H&H stable. H/o HTN c/w Lopressor prn   Pulm: O2 sat WNL on RA, increase ambulation, encourage incentive spirometry use  GI: tolerating clear diet  : UOP adequate, ulrich in place  Heme: HSQ and SCDs while in bed for DVT ppx  ID: afebrile, no signs of infection  Endo: T2DM on ISS, appriciate GHOS care  Fe: Replete electrolytes as needed, SLIV  Dispo: continue routine post-op care, f/u PT consult    seen and d/w GYN Onc team  Filomena Daniel, PGY-2

## 2021-11-25 NOTE — PROGRESS NOTE ADULT - PROBLEM SELECTOR PLAN 1
GYN ONC Fellow Addendum:    Pt seen and examined at bedside. Agree with above. Sravan removed this AM, pending TOV    VS reviewed  Labs reviewed    Continue current pain regimen  Tolerating reg diet  Encourage ambulation and IS use. Pt encouraged OOB to chair and to walk today w/ walker. For PT consult/eval tomorrow, additional services as indicated.  Replete lytes prn  DVT ppx: SQH  Dispo: cont inpt post op care for PT eval and optimization for d/c    Yohana Hernandez MD

## 2021-11-25 NOTE — PROGRESS NOTE ADULT - SUBJECTIVE AND OBJECTIVE BOX
Surgery Progress Note    Subjective:     Patient seen and examined at bedside. Patient without complaints this AM. POD 2. VSS, AF. Endorses she feels well, tolerating diet and urinating in ulrich    OBJECTIVE:     T(C): 36.5 (11-25-21 @ 10:02), Max: 36.9 (11-24-21 @ 18:00)  HR: 69 (11-25-21 @ 10:02) (60 - 71)  BP: 126/98 (11-25-21 @ 10:02) (113/52 - 132/68)  RR: 18 (11-25-21 @ 10:02) (17 - 19)  SpO2: 100% (11-25-21 @ 10:02) (96% - 100%)  Wt(kg): --    I&O's Detail    24 Nov 2021 07:01  -  25 Nov 2021 07:00  --------------------------------------------------------  IN:    Lactated Ringers: 750 mL    Oral Fluid: 1180 mL  Total IN: 1930 mL    OUT:    Indwelling Catheter - Urethral (mL): 1700 mL  Total OUT: 1700 mL    Total NET: 230 mL          PHYSICAL EXAM:    GENERAL: NAD, lying in bed comfortably.   Abdomen: Soft, non tender. Non distended   -Ulrich in    MEDICATIONS  (STANDING):  acetaminophen     Tablet .. 975 milliGRAM(s) Oral every 6 hours  aspirin enteric coated 81 milliGRAM(s) Oral daily  carvedilol 12.5 milliGRAM(s) Oral every 12 hours  glucagon  Injectable 1 milliGRAM(s) IntraMuscular once  heparin   Injectable 5000 Unit(s) SubCutaneous every 8 hours  insulin lispro (ADMELOG) corrective regimen sliding scale   SubCutaneous three times a day before meals  potassium phosphate IVPB 15 milliMole(s) IV Intermittent once  sacubitril 49 mG/valsartan 51 mG 1 Tablet(s) Oral two times a day    MEDICATIONS  (PRN):  oxyCODONE    IR 5 milliGRAM(s) Oral every 4 hours PRN Moderate Pain (4 - 6)      LABS:                          10.2   8.67  )-----------( 193      ( 25 Nov 2021 06:21 )             32.9     11-25    139  |  103  |  40<H>  ----------------------------<  142<H>  4.2   |  28  |  1.50<H>    Ca    9.6      25 Nov 2021 06:21  Phos  2.1     11-25  Mg     2.40     11-25

## 2021-11-25 NOTE — PROGRESS NOTE ADULT - SUBJECTIVE AND OBJECTIVE BOX
[facility] Division of Hospital Medicine  Kelly Camargo MD  Pager (M-F, 8A-5P):  In-house pager [phone]; Long-range pager [phone]  Other Times:  Please page Hospitalist in Charge -  In-house pager [phone]    Patient is a 81y old  Female who presents with a chief complaint of surgery (25 Nov 2021 17:49)      SUBJECTIVE / OVERNIGHT EVENTS:  ADDITIONAL REVIEW OF SYSTEMS:    MEDICATIONS  (STANDING):  acetaminophen     Tablet .. 975 milliGRAM(s) Oral every 6 hours  aspirin enteric coated 81 milliGRAM(s) Oral daily  carvedilol 12.5 milliGRAM(s) Oral every 12 hours  glucagon  Injectable 1 milliGRAM(s) IntraMuscular once  heparin   Injectable 5000 Unit(s) SubCutaneous every 8 hours  insulin lispro (ADMELOG) corrective regimen sliding scale   SubCutaneous three times a day before meals  sacubitril 49 mG/valsartan 51 mG 1 Tablet(s) Oral two times a day    MEDICATIONS  (PRN):  oxyCODONE    IR 5 milliGRAM(s) Oral every 4 hours PRN Moderate Pain (4 - 6)      CAPILLARY BLOOD GLUCOSE      POCT Blood Glucose.: 130 mg/dL (25 Nov 2021 17:03)  POCT Blood Glucose.: 151 mg/dL (25 Nov 2021 11:43)  POCT Blood Glucose.: 128 mg/dL (25 Nov 2021 08:17)  POCT Blood Glucose.: 155 mg/dL (24 Nov 2021 21:23)    I&O's Summary    24 Nov 2021 07:01  -  25 Nov 2021 07:00  --------------------------------------------------------  IN: 1930 mL / OUT: 1700 mL / NET: 230 mL    25 Nov 2021 07:01  -  25 Nov 2021 18:01  --------------------------------------------------------  IN: 0 mL / OUT: 200 mL / NET: -200 mL        PHYSICAL EXAM:  Vital Signs Last 24 Hrs  T(C): 36.8 (25 Nov 2021 17:15), Max: 36.9 (25 Nov 2021 02:00)  T(F): 98.2 (25 Nov 2021 17:15), Max: 98.4 (25 Nov 2021 02:00)  HR: 67 (25 Nov 2021 17:15) (60 - 72)  BP: 105/53 (25 Nov 2021 17:15) (105/53 - 131/58)  BP(mean): --  RR: 20 (25 Nov 2021 17:15) (17 - 20)  SpO2: 100% (25 Nov 2021 17:15) (96% - 100%)    CONSTITUTIONAL: NAD, well-developed, well-groomed  RESPIRATORY: Normal respiratory effort; lungs are clear to auscultation bilaterally  CARDIOVASCULAR: Regular rate and rhythm, normal S1 and S2, no murmur/rub/gallop; No lower extremity edema; Peripheral pulses are 2+ bilaterally  ABDOMEN: Nontender to palpation, normoactive bowel sounds, no rebound/guarding; No hepatosplenomegaly  MUSCLOSKELETAL:  Normal gait; no clubbing or cyanosis of digits; no joint swelling or tenderness to palpation  PSYCH: A+O to person, place, and time; affect appropriate  NEUROLOGY: CN 2-12 are intact and symmetric; no gross sensory deficits;   SKIN: No rashes; no palpable lesions    LABS:                        10.2   8.67  )-----------( 193      ( 25 Nov 2021 06:21 )             32.9     11-25    139  |  103  |  40<H>  ----------------------------<  142<H>  4.2   |  28  |  1.50<H>    Ca    9.6      25 Nov 2021 06:21  Phos  2.1     11-25  Mg     2.40     11-25    RADIOLOGY & ADDITIONAL TESTS:  Results Reviewed:   Imaging Personally Reviewed:  Electrocardiogram Personally Reviewed:    COORDINATION OF CARE:  Care Discussed with Consultants/Other Providers [Y/N]: GYN onc re overall care   Prior or Outpatient Records Reviewed [Y/N]:

## 2021-11-25 NOTE — PROGRESS NOTE ADULT - PROBLEM SELECTOR PLAN 1
s/p CURT, BLSO, care per primary team.  encourage ambulation, bowel function returning, pain control, IVFs, IS, DVT ppx (  )

## 2021-11-26 ENCOUNTER — TRANSCRIPTION ENCOUNTER (OUTPATIENT)
Age: 81
End: 2021-11-26

## 2021-11-26 VITALS
TEMPERATURE: 98 F | HEART RATE: 71 BPM | DIASTOLIC BLOOD PRESSURE: 58 MMHG | RESPIRATION RATE: 18 BRPM | SYSTOLIC BLOOD PRESSURE: 124 MMHG | OXYGEN SATURATION: 97 %

## 2021-11-26 LAB
ANION GAP SERPL CALC-SCNC: 10 MMOL/L — SIGNIFICANT CHANGE UP (ref 7–14)
BASOPHILS # BLD AUTO: 0.01 K/UL — SIGNIFICANT CHANGE UP (ref 0–0.2)
BASOPHILS NFR BLD AUTO: 0.1 % — SIGNIFICANT CHANGE UP (ref 0–2)
BUN SERPL-MCNC: 35 MG/DL — HIGH (ref 7–23)
CALCIUM SERPL-MCNC: 9.7 MG/DL — SIGNIFICANT CHANGE UP (ref 8.4–10.5)
CHLORIDE SERPL-SCNC: 101 MMOL/L — SIGNIFICANT CHANGE UP (ref 98–107)
CO2 SERPL-SCNC: 27 MMOL/L — SIGNIFICANT CHANGE UP (ref 22–31)
CREAT SERPL-MCNC: 1.43 MG/DL — HIGH (ref 0.5–1.3)
EOSINOPHIL # BLD AUTO: 0.22 K/UL — SIGNIFICANT CHANGE UP (ref 0–0.5)
EOSINOPHIL NFR BLD AUTO: 3.1 % — SIGNIFICANT CHANGE UP (ref 0–6)
GLUCOSE BLDC GLUCOMTR-MCNC: 132 MG/DL — HIGH (ref 70–99)
GLUCOSE BLDC GLUCOMTR-MCNC: 133 MG/DL — HIGH (ref 70–99)
GLUCOSE SERPL-MCNC: 130 MG/DL — HIGH (ref 70–99)
HCT VFR BLD CALC: 36 % — SIGNIFICANT CHANGE UP (ref 34.5–45)
HGB BLD-MCNC: 11.2 G/DL — LOW (ref 11.5–15.5)
IANC: 3.89 K/UL — SIGNIFICANT CHANGE UP (ref 1.5–8.5)
IMM GRANULOCYTES NFR BLD AUTO: 0.6 % — SIGNIFICANT CHANGE UP (ref 0–1.5)
LYMPHOCYTES # BLD AUTO: 2.16 K/UL — SIGNIFICANT CHANGE UP (ref 1–3.3)
LYMPHOCYTES # BLD AUTO: 30.6 % — SIGNIFICANT CHANGE UP (ref 13–44)
MAGNESIUM SERPL-MCNC: 2.5 MG/DL — SIGNIFICANT CHANGE UP (ref 1.6–2.6)
MCHC RBC-ENTMCNC: 28.7 PG — SIGNIFICANT CHANGE UP (ref 27–34)
MCHC RBC-ENTMCNC: 31.1 GM/DL — LOW (ref 32–36)
MCV RBC AUTO: 92.3 FL — SIGNIFICANT CHANGE UP (ref 80–100)
MONOCYTES # BLD AUTO: 0.74 K/UL — SIGNIFICANT CHANGE UP (ref 0–0.9)
MONOCYTES NFR BLD AUTO: 10.5 % — SIGNIFICANT CHANGE UP (ref 2–14)
NEUTROPHILS # BLD AUTO: 3.89 K/UL — SIGNIFICANT CHANGE UP (ref 1.8–7.4)
NEUTROPHILS NFR BLD AUTO: 55.1 % — SIGNIFICANT CHANGE UP (ref 43–77)
NON-GYNECOLOGICAL CYTOLOGY STUDY: SIGNIFICANT CHANGE UP
NRBC # BLD: 0 /100 WBCS — SIGNIFICANT CHANGE UP
NRBC # FLD: 0 K/UL — SIGNIFICANT CHANGE UP
PHOSPHATE SERPL-MCNC: 2.6 MG/DL — SIGNIFICANT CHANGE UP (ref 2.5–4.5)
PLATELET # BLD AUTO: 206 K/UL — SIGNIFICANT CHANGE UP (ref 150–400)
POTASSIUM SERPL-MCNC: 4.2 MMOL/L — SIGNIFICANT CHANGE UP (ref 3.5–5.3)
POTASSIUM SERPL-SCNC: 4.2 MMOL/L — SIGNIFICANT CHANGE UP (ref 3.5–5.3)
RBC # BLD: 3.9 M/UL — SIGNIFICANT CHANGE UP (ref 3.8–5.2)
RBC # FLD: 14.5 % — SIGNIFICANT CHANGE UP (ref 10.3–14.5)
SODIUM SERPL-SCNC: 138 MMOL/L — SIGNIFICANT CHANGE UP (ref 135–145)
WBC # BLD: 7.06 K/UL — SIGNIFICANT CHANGE UP (ref 3.8–10.5)
WBC # FLD AUTO: 7.06 K/UL — SIGNIFICANT CHANGE UP (ref 3.8–10.5)

## 2021-11-26 PROCEDURE — 99232 SBSQ HOSP IP/OBS MODERATE 35: CPT

## 2021-11-26 RX ORDER — OXYCODONE HYDROCHLORIDE 5 MG/1
1 TABLET ORAL
Qty: 8 | Refills: 0
Start: 2021-11-26 | End: 2021-11-27

## 2021-11-26 RX ORDER — SODIUM,POTASSIUM PHOSPHATES 278-250MG
1 POWDER IN PACKET (EA) ORAL ONCE
Refills: 0 | Status: COMPLETED | OUTPATIENT
Start: 2021-11-26 | End: 2021-11-26

## 2021-11-26 RX ORDER — ACETAMINOPHEN 500 MG
3 TABLET ORAL
Qty: 0 | Refills: 0 | DISCHARGE
Start: 2021-11-26

## 2021-11-26 RX ADMIN — OXYCODONE HYDROCHLORIDE 5 MILLIGRAM(S): 5 TABLET ORAL at 06:11

## 2021-11-26 RX ADMIN — CARVEDILOL PHOSPHATE 12.5 MILLIGRAM(S): 80 CAPSULE, EXTENDED RELEASE ORAL at 05:23

## 2021-11-26 RX ADMIN — OXYCODONE HYDROCHLORIDE 5 MILLIGRAM(S): 5 TABLET ORAL at 13:20

## 2021-11-26 RX ADMIN — SACUBITRIL AND VALSARTAN 1 TABLET(S): 24; 26 TABLET, FILM COATED ORAL at 05:23

## 2021-11-26 RX ADMIN — HEPARIN SODIUM 5000 UNIT(S): 5000 INJECTION INTRAVENOUS; SUBCUTANEOUS at 13:44

## 2021-11-26 RX ADMIN — Medication 1 PACKET(S): at 08:35

## 2021-11-26 RX ADMIN — Medication 975 MILLIGRAM(S): at 14:19

## 2021-11-26 RX ADMIN — OXYCODONE HYDROCHLORIDE 5 MILLIGRAM(S): 5 TABLET ORAL at 05:27

## 2021-11-26 RX ADMIN — HEPARIN SODIUM 5000 UNIT(S): 5000 INJECTION INTRAVENOUS; SUBCUTANEOUS at 05:23

## 2021-11-26 RX ADMIN — Medication 975 MILLIGRAM(S): at 01:24

## 2021-11-26 RX ADMIN — OXYCODONE HYDROCHLORIDE 5 MILLIGRAM(S): 5 TABLET ORAL at 12:50

## 2021-11-26 RX ADMIN — Medication 975 MILLIGRAM(S): at 02:00

## 2021-11-26 RX ADMIN — Medication 975 MILLIGRAM(S): at 08:34

## 2021-11-26 RX ADMIN — Medication 81 MILLIGRAM(S): at 12:46

## 2021-11-26 NOTE — PROGRESS NOTE ADULT - ASSESSMENT
80yo F s/p TLH/BSO and umbilical hernia repair on 11/23, recovering well.    PLAN:  - Diet per Gyn Onc team  - Pain control PRN  - C/w care per primary team    Team A Surgery  #[id number]

## 2021-11-26 NOTE — PROGRESS NOTE ADULT - SUBJECTIVE AND OBJECTIVE BOX
[facility]  Division of Hospital Medicine  Candy Carranza MD  Pager: [phone]      Patient is a 81y old  Female who presents with a chief complaint of surgery (25 Nov 2021 17:49)      SUBJECTIVE / OVERNIGHT EVENTS: Patient examined at bedside. No SOB, Chest pain, or LE swelling.   ADDITIONAL REVIEW OF SYSTEMS:    MEDICATIONS  (STANDING):  acetaminophen     Tablet .. 975 milliGRAM(s) Oral every 6 hours  aspirin enteric coated 81 milliGRAM(s) Oral daily  carvedilol 12.5 milliGRAM(s) Oral every 12 hours  glucagon  Injectable 1 milliGRAM(s) IntraMuscular once  heparin   Injectable 5000 Unit(s) SubCutaneous every 8 hours  insulin lispro (ADMELOG) corrective regimen sliding scale   SubCutaneous three times a day before meals  sacubitril 49 mG/valsartan 51 mG 1 Tablet(s) Oral two times a day    MEDICATIONS  (PRN):  oxyCODONE    IR 5 milliGRAM(s) Oral every 4 hours PRN Moderate Pain (4 - 6)      CAPILLARY BLOOD GLUCOSE      POCT Blood Glucose.: 132 mg/dL (26 Nov 2021 08:14)  POCT Blood Glucose.: 167 mg/dL (25 Nov 2021 21:33)  POCT Blood Glucose.: 130 mg/dL (25 Nov 2021 17:03)  POCT Blood Glucose.: 151 mg/dL (25 Nov 2021 11:43)    I&O's Summary    25 Nov 2021 07:01  -  26 Nov 2021 07:00  --------------------------------------------------------  IN: 1560 mL / OUT: 850 mL / NET: 710 mL        PHYSICAL EXAM:  Vital Signs Last 24 Hrs  T(C): 36.9 (26 Nov 2021 10:43), Max: 36.9 (25 Nov 2021 21:54)  T(F): 98.5 (26 Nov 2021 10:43), Max: 98.5 (26 Nov 2021 10:43)  HR: 67 (26 Nov 2021 10:43) (58 - 74)  BP: 118/59 (26 Nov 2021 10:43) (105/53 - 135/66)  BP(mean): --  RR: 18 (26 Nov 2021 10:43) (18 - 20)  SpO2: 100% (26 Nov 2021 10:43) (94% - 100%)  CONSTITUTIONAL: obese F, lying in bed, looks comfortable  RESPIRATORY: dec BS bases  CARDIOVASCULAR: Regular rate and rhythm, normal S1 and S2, no murmur/rub/gallop; No lower extremity edema; Peripheral pulses are 2+ bilaterally  ABDOMEN: obese, incisions covered, ND, soft, NT  MUSCLOSKELETAL:  no clubbing or cyanosis of digits; no joint swelling or tenderness to palpation  PSYCH: A+O to person, place, and time; affect appropriate  NEUROLOGY: nonfocal   SKIN: No rashes; no palpable lesions      LABS:                        11.2   7.06  )-----------( 206      ( 26 Nov 2021 06:24 )             36.0     11-26    138  |  101  |  35<H>  ----------------------------<  130<H>  4.2   |  27  |  1.43<H>    Ca    9.7      26 Nov 2021 06:24  Phos  2.6     11-26  Mg     2.50     11-26                  RADIOLOGY & ADDITIONAL TESTS:  Results Reviewed:   Imaging Personally Reviewed:  Electrocardiogram Personally Reviewed:    COORDINATION OF CARE:  Care Discussed with Consultants/Other Providers [Y/N]:  Prior or Outpatient Records Reviewed [Y/N]:

## 2021-11-26 NOTE — PROGRESS NOTE ADULT - SUBJECTIVE AND OBJECTIVE BOX
Subjective:  Patient seen at bedside this AM. Reports feeling well, without complaints. Denies chest pain, SOB. Tolerating diet without N/V.     24h Events:   - Overnight, no acute events    Objective:  Vital Signs  T(C): 36.7 (11-26 @ 05:20), Max: 36.9 (11-25 @ 21:54)  HR: 58 (11-26 @ 08:15) (58 - 74)  BP: 135/66 (11-26 @ 05:20) (105/53 - 135/66)  RR: 18 (11-26 @ 05:20) (18 - 20)  SpO2: 100% (11-26 @ 08:15) (94% - 100%)  11-25-21 @ 07:01  -  11-26-21 @ 07:00  --------------------------------------------------------  IN:  Total IN: 0 mL    OUT:    Indwelling Catheter - Urethral (mL): 200 mL    Voided (mL): 650 mL  Total OUT: 850 mL    Total NET: -850 mL      Physical Exam:  GEN: resting in bed comfortably in NAD  RESP: no increased WOB  ABD: soft, non-distended, non-tender to palpation without rebound tenderness or guarding; incisions c/d/i  EXTR: warm, well-perfused without gross deformities; spontaneous movement in b/l U/L extrem  NEURO: awake, alert    Labs:             11.2   7.06  )-----------( 206      ( 26 Nov 2021 06:24 )             36.0     138  |  101  |  35<H>  ----------------------------<  130<H>  4.2   |  27  |  1.43<H>    Ca    9.7      26 Nov 2021 06:24  Phos  2.6     11-26  Mg     2.50     11-26      POCT Blood Glucose.: 132 mg/dL (26 Nov 2021 08:14)  POCT Blood Glucose.: 167 mg/dL (25 Nov 2021 21:33)  POCT Blood Glucose.: 130 mg/dL (25 Nov 2021 17:03)  POCT Blood Glucose.: 151 mg/dL (25 Nov 2021 11:43)      Medications:  MEDICATIONS  (STANDING):  acetaminophen     Tablet .. 975 milliGRAM(s) Oral every 6 hours  aspirin enteric coated 81 milliGRAM(s) Oral daily  carvedilol 12.5 milliGRAM(s) Oral every 12 hours  glucagon  Injectable 1 milliGRAM(s) IntraMuscular once  heparin   Injectable 5000 Unit(s) SubCutaneous every 8 hours  insulin lispro (ADMELOG) corrective regimen sliding scale   SubCutaneous three times a day before meals  sacubitril 49 mG/valsartan 51 mG 1 Tablet(s) Oral two times a day    MEDICATIONS  (PRN):  oxyCODONE    IR 5 milliGRAM(s) Oral every 4 hours PRN Moderate Pain (4 - 6)      Imaging:

## 2021-11-26 NOTE — DISCHARGE NOTE PROVIDER - HOSPITAL COURSE
81y PMH HTN, HLD, T2DM, DON, b/l knee replacements s/p RA TLH BSO sentinel LND, umbilical hernia repair EBL 200cc. On POD#1 patient was tolerating clear diet and UOP was adequate. She was restarted on her home Coreg, Entresto, ASA, and remained on an Insulin sliding scale.   On POD#2 Hinson was removed and patient was voiding spontaneously. She was transitioned to a regular carbohydrate consistent diet and tolerated it well.  On POD#3: PT recommended home therapy.     On day of discharge patient was tolerating PO intake, ambulating at her baseline, pain was well controlled and she will have close follow up with Dr. Yusuf outpatient.    Follow up appointment: 12/6/21 at 10:30am

## 2021-11-26 NOTE — DISCHARGE NOTE NURSING/CASE MANAGEMENT/SOCIAL WORK - NSDCPNINST_GEN_ALL_CORE
notify the MD if having fever ,incision site looks red ,swollen or discharge noted ,nauseous or vomiting

## 2021-11-26 NOTE — DISCHARGE NOTE PROVIDER - NSDCFUADDINST_GEN_ALL_CORE_FT
Return to your regular way of eating.     Resume normal activity as tolerated, but no heavy lifting or strenuous activity for 6 weeks. Complete vaginal rest, no tampons, no douching, no tub bathing, no sexual activities for 6 weeks unless otherwise instructed by your doctor.      No driving while on narcotic pain medication.      Call your doctor with any signs and symptoms of infection such as fever (>100.4 F), chills, nausea or vomiting.  Call your doctor if you're unable to tolerate food or have difficulty urinating.  Call your doctor if you have pain that is not relieved by your prescribed medications. Call your doctor with redness or swelling at the incision site, fluid leakage or wound separation.    Notify your doctor with any other concerns. Follow up with Dr. Yusuf on 12/6/21 at 10:30am.  Return to your regular way of eating. You can take Tylenol up to 975mg every 6 hours. Oxycodone has been prescribed for breakthrough pain. No change to home medications.       Resume normal activity as tolerated, but no heavy lifting or strenuous activity for 6 weeks. Complete vaginal rest, no tampons, no douching, no tub bathing, no sexual activities for 6 weeks unless otherwise instructed by your doctor.      No driving while on narcotic pain medication.      Call your doctor with any signs and symptoms of infection such as fever (>100.4 F), chills, nausea or vomiting.  Call your doctor if you're unable to tolerate food or have difficulty urinating.  Call your doctor if you have pain that is not relieved by your prescribed medications. Call your doctor with redness or swelling at the incision site, fluid leakage or wound separation.    Notify your doctor with any other concerns. Follow up with Dr. Yusfu on 12/6/21 at 10:30am.

## 2021-11-26 NOTE — PROGRESS NOTE ADULT - PROBLEM SELECTOR PLAN 5
team d/w outpatient cardiologist Dr. Leon [phone], started on entresto from recent admission at [place], EF normal per outpatient cardiologist  - at this time patient w/o signs of volume overload, and given Cr improving w/o diuresis would hold home torsemide/metolazone  - c/w entresto (sacubitril 49/valsartain 51)  - c/w home coreg 12.5 mg bid.
team d/w outpatient cardiologist Dr. Leon [phone], started on entresto from recent admission at [place], EF normal per outpatient cardiologist  - at this time patient w/o signs of volume overload, and given Cr improving w/o diuresis would hold home torsemide/metolazone. Would follow up with outpatient cardiologist for re-starting as outpatient.   - c/w entresto (sacubitril 49/valsartain 51)  - c/w home coreg 12.5 mg bid.

## 2021-11-26 NOTE — DISCHARGE NOTE PROVIDER - NSDCMRMEDTOKEN_GEN_ALL_CORE_FT
acetaminophen 325 mg oral tablet: 3 tab(s) orally every 6 hours  amLODIPine 5 mg oral tablet: 1 tab(s) orally once a day  Aspir 81 oral delayed release tablet: 1 tab(s) orally once a day  atorvastatin 40 mg oral tablet: 1 tab(s) orally once a day  Entresto 49 mg-51 mg oral tablet: 1 tab(s) orally 2 times a day  furosemide 40 mg oral tablet: 1 tab(s) orally once a day  gabapentin 300 mg oral capsule: 1 cap(s) orally 3 times a day  glimepiride 2 mg oral tablet: 1 tab(s) orally 2 times a day  isosorbide dinitrate 20 mg oral tablet: 1 tab(s) orally 2 times a day  metOLazone 2.5 mg oral tablet: 1 tab(s) orally once a day (at bedtime)  metOLazone 5 mg oral tablet: 1 tab(s) orally once a day  oxyCODONE 5 mg oral tablet: 1 tab(s) orally every 6 hours, As Needed - for severe pain MDD:4 tabs

## 2021-11-26 NOTE — PROGRESS NOTE ADULT - PROBLEM SELECTOR PLAN 1
GYN ONC Fellow Addendum:    Pt seen and examined at bedside. Agree with above. Sravan removed this AM, pending TOV    VS reviewed  Labs reviewed    Continue current pain regimen  Tolerating reg diet  Encourage ambulation and IS use. Pt encouraged OOB to chair and to walk today w/ walker. For PT consult/eval tomorrow, additional services as indicated.  Replete lytes prn  DVT ppx: SQH  Dispo: cont inpt post op care for PT eval and optimization for d/c    Yohana Hernandez MD GYN ONC Fellow Addendum:    Pt seen and examined at bedside. Agree with above. Pain controlled. Tolerating po. +flatus, -bm. Ambulating to bathroom without issue.     VS reviewed  Labs reviewed    - PT to eval today  - Continue current pain regimen  - Reg diet  - Encourage ambulation and IS use  - Replete lytes prn  - DVT ppx  - OOB  - Dispo: pending PT eval, likely home today    ALVIN Frost, PGY6

## 2021-11-26 NOTE — DISCHARGE NOTE NURSING/CASE MANAGEMENT/SOCIAL WORK - PATIENT PORTAL LINK FT
You can access the FollowMyHealth Patient Portal offered by [facility] by registering at the following website: [email or web address]. By joining [email or web address]’s FollowMyHealth portal, you will also be able to view your health information using other applications (apps) compatible with our system.

## 2021-11-26 NOTE — PROGRESS NOTE ADULT - PROBLEM SELECTOR PLAN 6
based on documentation, pt w/ Cr 3.96 in 12/20, here 1.68, was 2.3 few weeks back at presurgical testing, continue to trend at this time, pt is non-oliguric  - confirmed w/ outpatient cardiologist Cr 1.2-1.8 09/21, trend at this time.  stable
based on documentation, pt w/ Cr 3.96 in 12/20, here 1.68, was 2.3 few weeks back at presurgical testing, continue to trend at this time, pt is non-oliguric  - confirmed w/ outpatient cardiologist Cr 1.2-1.8 09/21, trend at this time.  stable

## 2021-11-26 NOTE — DISCHARGE NOTE PROVIDER - CARE PROVIDER_API CALL
Franko Yusuf)  Gynecologic Oncology; Obstetrics and Gynecology  [address] [place] [address]  Phone: [phone]  Fax: [phone]  Follow Up Time:

## 2021-11-26 NOTE — PROGRESS NOTE ADULT - SUBJECTIVE AND OBJECTIVE BOX
Gyn ONC Progress Note POD #3/HD#4    Subjective:   Patient seen and examined at bedside.  No acute events overnight. No acute complaints.  Pain well controlled. Patient is ambulating and tolerating regular diet. Patient is passing flatus.  Patient is voiding spontaneously. Denies lightheadedness, dizziness, CP, SOB, N/V, fevers, and chills.    Objective:  T(F): 98 (11-26-21 @ 05:20), Max: 98.4 (11-25-21 @ 21:54)  HR: 74 (11-26-21 @ 05:20) (60 - 74)  BP: 135/66 (11-26-21 @ 05:20) (105/53 - 135/66)  RR: 18 (11-26-21 @ 05:20) (18 - 20)  SpO2: 94% (11-26-21 @ 05:20) (94% - 100%)  Wt(kg): --  I&O's Summary    25 Nov 2021 07:01  -  26 Nov 2021 07:00  --------------------------------------------------------  IN: 1560 mL / OUT: 850 mL / NET: 710 mL      CAPILLARY BLOOD GLUCOSE      POCT Blood Glucose.: 167 mg/dL (25 Nov 2021 21:33)  POCT Blood Glucose.: 130 mg/dL (25 Nov 2021 17:03)  POCT Blood Glucose.: 151 mg/dL (25 Nov 2021 11:43)  POCT Blood Glucose.: 128 mg/dL (25 Nov 2021 08:17)      MEDICATIONS  (STANDING):  acetaminophen     Tablet .. 975 milliGRAM(s) Oral every 6 hours  aspirin enteric coated 81 milliGRAM(s) Oral daily  carvedilol 12.5 milliGRAM(s) Oral every 12 hours  glucagon  Injectable 1 milliGRAM(s) IntraMuscular once  heparin   Injectable 5000 Unit(s) SubCutaneous every 8 hours  insulin lispro (ADMELOG) corrective regimen sliding scale   SubCutaneous three times a day before meals  sacubitril 49 mG/valsartan 51 mG 1 Tablet(s) Oral two times a day    MEDICATIONS  (PRN):  oxyCODONE    IR 5 milliGRAM(s) Oral every 4 hours PRN Moderate Pain (4 - 6)      Physical Exam:  Constitutional: NAD, A+O x3  CV: RRR  Lungs: clear to auscultation bilaterally  Abdomen: soft, nondistended, no guarding, no rebound, normal bowel sounds  Incision: laparoscopic and umbilical incision site clean, dry, intact with steri strips in place  Extremities: sym b/l LE edema (per pt baseline), no overlaying skin changes or calf tenderness     LABS:    11-26    138    |  101    |  35<H>  ----------------------------<  130<H>  4.2     |  27     |  1.43<H>  11-25    139    |  103    |  40<H>  ----------------------------<  142<H>  4.2     |  28     |  1.50<H>    Ca    9.7        26 Nov 2021 06:24  Ca    9.6        25 Nov 2021 06:21  Phos  2.6       11-26  Phos  2.1       11-25  Mg     2.50      11-26  Mg     2.40      11-25

## 2021-11-26 NOTE — PROGRESS NOTE ADULT - ATTENDING COMMENTS
pt seen and examined  agree with note above  pod 1 s/p VHR with concomittant robotic CURT/BSO  pt resting in bed, NAD  obese, soft, nd, nt no r/g  wounds c/d/i  adv diet as tolerated per GynOnc  f/u labs in am  oob to ambulate with assistance  dc planning per GynOnc  will follow with you  d/w pt/family/RN & Dr Yusuf @ bedside in detail.
pt seen and examined  agree with note above  pod 2 s/p VHR with concomittant robotic CURT/BSO  pt resting in bed, NAD  obese, soft, nd, nt no r/g  wounds c/d/i  adv diet as tolerated per GynOnc  f/u labs in am  oob to ambulate with assistance  dc planning per GynOnc  will follow with you  d/w pt/family/RN & Dr Yusuf @ bedside in detail.
Patient seen and examined.   Doing well after surgery.   Agree with assessment and plan as written by fellow and housestaff.   PT evaluation tomorrow. Has nurse at home for care.   Anticipate discharge to home tomorrow.
Patient seen and examined.   Doing well after surgery.   Agree with assessment and plan as written by fellow and housestaff.   Stable to DC home today.

## 2021-11-26 NOTE — PROGRESS NOTE ADULT - ASSESSMENT
A/P: 81y POD#3 s/p RA TLH BSO sentinel LND, umbilical hernia repair . Pt stable this morning with no acute complaints.     Neuro: pain well controlled on current regimen Tylenol and Oxycodone   CV: hemodynamically stable, H&H stable. H/o HTN and CHF c/w home Entresto, Coreg and ASA  Pulm: O2 sat WNL on RA, increase ambulation, encourage incentive spirometry use  GI: tolerating regular diet  : UOP adequate, voiding spontaneously  Heme: HSQ and SCDs while in bed for DVT ppx  ID: afebrile, no signs of infection  Endo: T2DM on ISS, appreciate GHOS care  Fe: Replete electrolytes as needed, SLIV  Dispo: continue routine post-op care, f/u PT consult    seen and d/w GYN Onc team  Malina Alejandro PGY2

## 2021-11-26 NOTE — DISCHARGE NOTE NURSING/CASE MANAGEMENT/SOCIAL WORK - NSDCCRTYPESERV_GEN_ALL_CORE_FT
To resume aide hours on 11/27/2021. To resume aide hours on 11/27/2021.  /  For transport home at 7pm  Trip # [id number]

## 2021-11-30 ENCOUNTER — NON-APPOINTMENT (OUTPATIENT)
Age: 81
End: 2021-11-30

## 2021-11-30 PROBLEM — E66.01 MORBID OBESITY WITH BMI OF 40.0-44.9, ADULT: Status: ACTIVE | Noted: 2021-08-30

## 2021-12-02 ENCOUNTER — NON-APPOINTMENT (OUTPATIENT)
Age: 81
End: 2021-12-02

## 2021-12-02 LAB — SURGICAL PATHOLOGY STUDY: SIGNIFICANT CHANGE UP

## 2021-12-03 ENCOUNTER — NON-APPOINTMENT (OUTPATIENT)
Age: 81
End: 2021-12-03

## 2021-12-06 ENCOUNTER — APPOINTMENT (OUTPATIENT)
Dept: GYNECOLOGIC ONCOLOGY | Facility: CLINIC | Age: 81
End: 2021-12-06
Payer: MEDICARE

## 2021-12-06 VITALS — HEART RATE: 79 BPM | SYSTOLIC BLOOD PRESSURE: 119 MMHG | TEMPERATURE: 97.8 F | DIASTOLIC BLOOD PRESSURE: 74 MMHG

## 2021-12-06 DIAGNOSIS — E66.01 MORBID (SEVERE) OBESITY DUE TO EXCESS CALORIES: ICD-10-CM

## 2021-12-06 PROBLEM — E11.9 TYPE 2 DIABETES MELLITUS WITHOUT COMPLICATIONS: Chronic | Status: ACTIVE | Noted: 2021-11-09

## 2021-12-06 PROBLEM — E66.9 OBESITY, UNSPECIFIED: Chronic | Status: ACTIVE | Noted: 2021-11-09

## 2021-12-06 PROBLEM — I25.10 ATHEROSCLEROTIC HEART DISEASE OF NATIVE CORONARY ARTERY WITHOUT ANGINA PECTORIS: Chronic | Status: ACTIVE | Noted: 2021-11-09

## 2021-12-06 PROBLEM — E78.5 HYPERLIPIDEMIA, UNSPECIFIED: Chronic | Status: ACTIVE | Noted: 2021-11-09

## 2021-12-06 PROBLEM — G47.30 SLEEP APNEA, UNSPECIFIED: Chronic | Status: ACTIVE | Noted: 2021-11-09

## 2021-12-06 PROBLEM — K42.9 UMBILICAL HERNIA WITHOUT OBSTRUCTION OR GANGRENE: Chronic | Status: ACTIVE | Noted: 2021-11-09

## 2021-12-06 PROBLEM — M25.9 JOINT DISORDER, UNSPECIFIED: Chronic | Status: ACTIVE | Noted: 2021-11-09

## 2021-12-06 PROBLEM — C54.1 MALIGNANT NEOPLASM OF ENDOMETRIUM: Chronic | Status: ACTIVE | Noted: 2021-11-09

## 2021-12-06 PROBLEM — Z86.69 PERSONAL HISTORY OF OTHER DISEASES OF THE NERVOUS SYSTEM AND SENSE ORGANS: Chronic | Status: ACTIVE | Noted: 2021-11-09

## 2021-12-06 PROBLEM — I10 ESSENTIAL (PRIMARY) HYPERTENSION: Chronic | Status: ACTIVE | Noted: 2021-11-09

## 2021-12-06 PROCEDURE — 99024 POSTOP FOLLOW-UP VISIT: CPT

## 2022-03-11 ENCOUNTER — APPOINTMENT (OUTPATIENT)
Dept: GYNECOLOGIC ONCOLOGY | Facility: CLINIC | Age: 82
End: 2022-03-11
Payer: MEDICARE

## 2022-03-14 ENCOUNTER — NON-APPOINTMENT (OUTPATIENT)
Age: 82
End: 2022-03-14

## 2022-04-04 ENCOUNTER — APPOINTMENT (OUTPATIENT)
Dept: GYNECOLOGIC ONCOLOGY | Facility: CLINIC | Age: 82
End: 2022-04-04
Payer: MEDICARE

## 2022-04-04 VITALS
WEIGHT: 250 LBS | SYSTOLIC BLOOD PRESSURE: 132 MMHG | HEART RATE: 91 BPM | HEIGHT: 68 IN | BODY MASS INDEX: 37.89 KG/M2 | DIASTOLIC BLOOD PRESSURE: 80 MMHG

## 2022-04-04 PROCEDURE — 99213 OFFICE O/P EST LOW 20 MIN: CPT

## 2022-04-04 RX ORDER — ISOSORBIDE DINITRATE 20 MG/1
20 TABLET ORAL
Refills: 0 | Status: DISCONTINUED | COMMUNITY
End: 2022-04-04

## 2022-04-04 RX ORDER — POTASSIUM CHLORIDE 750 MG/1
10 CAPSULE, EXTENDED RELEASE ORAL
Refills: 0 | Status: DISCONTINUED | COMMUNITY
End: 2022-04-04

## 2022-04-04 RX ORDER — CARVEDILOL 12.5 MG/1
12.5 TABLET, FILM COATED ORAL
Refills: 0 | Status: DISCONTINUED | COMMUNITY
End: 2022-04-04

## 2022-04-04 RX ORDER — GABAPENTIN 300 MG/1
300 CAPSULE ORAL
Refills: 0 | Status: DISCONTINUED | COMMUNITY
End: 2022-04-04

## 2022-04-04 RX ORDER — GLIMEPIRIDE 2 MG/1
2 TABLET ORAL
Refills: 0 | Status: DISCONTINUED | COMMUNITY
End: 2022-04-04

## 2022-04-04 RX ORDER — ATORVASTATIN CALCIUM 20 MG/1
20 TABLET, FILM COATED ORAL
Refills: 0 | Status: DISCONTINUED | COMMUNITY
End: 2022-04-04

## 2022-04-12 NOTE — PROGRESS NOTE ADULT - PROBLEM SELECTOR PLAN 2
c/w home ASA 81 if cleared by primary team, hx of PCI w/ stent in 2013.
c/w home ASA 81 if cleared by primary team, hx of PCI w/ stent in 2013.
Female

## 2022-06-28 ENCOUNTER — NON-APPOINTMENT (OUTPATIENT)
Age: 82
End: 2022-06-28

## 2022-07-08 ENCOUNTER — APPOINTMENT (OUTPATIENT)
Dept: GYNECOLOGIC ONCOLOGY | Facility: CLINIC | Age: 82
End: 2022-07-08

## 2022-08-05 ENCOUNTER — APPOINTMENT (OUTPATIENT)
Dept: GYNECOLOGIC ONCOLOGY | Facility: CLINIC | Age: 82
End: 2022-08-05

## 2022-08-29 ENCOUNTER — APPOINTMENT (OUTPATIENT)
Dept: GYNECOLOGIC ONCOLOGY | Facility: CLINIC | Age: 82
End: 2022-08-29

## 2022-08-29 VITALS
WEIGHT: 260 LBS | HEIGHT: 68 IN | SYSTOLIC BLOOD PRESSURE: 149 MMHG | DIASTOLIC BLOOD PRESSURE: 73 MMHG | BODY MASS INDEX: 39.4 KG/M2 | HEART RATE: 60 BPM

## 2022-08-29 DIAGNOSIS — K42.9 UMBILICAL HERNIA W/OUT OBSTRUCTION OR GANGRENE: ICD-10-CM

## 2022-08-29 PROCEDURE — 99213 OFFICE O/P EST LOW 20 MIN: CPT

## 2022-08-29 RX ORDER — OXYCODONE 5 MG/1
5 TABLET ORAL
Qty: 10 | Refills: 0 | Status: DISCONTINUED | COMMUNITY
Start: 2021-12-03 | End: 2022-08-29

## 2022-08-30 PROBLEM — K42.9 UMBILICAL HERNIA: Status: ACTIVE | Noted: 2021-08-30

## 2022-09-15 ENCOUNTER — NON-APPOINTMENT (OUTPATIENT)
Age: 82
End: 2022-09-15

## 2022-10-27 NOTE — PROVIDER CONTACT NOTE (OTHER) - SITUATION
I independently performed the documented: EP=[id number], refused Admelog. requested to check FS before dinner.

## 2022-10-31 NOTE — DISCHARGE NOTE PROVIDER - NSDCCPCAREPLAN_GEN_ALL_CORE_FT
PRINCIPAL DISCHARGE DIAGNOSIS  Diagnosis: Endometrial adenocarcinoma  Assessment and Plan of Treatment:        Include Location In Plan?: No Detail Level: Generalized

## 2022-11-28 ENCOUNTER — APPOINTMENT (OUTPATIENT)
Dept: GYNECOLOGIC ONCOLOGY | Facility: CLINIC | Age: 82
End: 2022-11-28

## 2022-12-05 ENCOUNTER — NON-APPOINTMENT (OUTPATIENT)
Age: 82
End: 2022-12-05

## 2022-12-05 PROBLEM — C54.1 ENDOMETRIAL CANCER: Status: ACTIVE | Noted: 2021-08-20

## 2022-12-12 ENCOUNTER — APPOINTMENT (OUTPATIENT)
Dept: GYNECOLOGIC ONCOLOGY | Facility: CLINIC | Age: 82
End: 2022-12-12

## 2022-12-12 DIAGNOSIS — C54.1 MALIGNANT NEOPLASM OF ENDOMETRIUM: ICD-10-CM

## 2024-04-10 ENCOUNTER — APPOINTMENT (OUTPATIENT)
Dept: ORTHOPEDIC SURGERY | Facility: CLINIC | Age: 84
End: 2024-04-10
Payer: MEDICARE

## 2024-04-10 VITALS — BODY MASS INDEX: 39.4 KG/M2 | WEIGHT: 260 LBS | HEIGHT: 68 IN

## 2024-04-10 PROCEDURE — 20610 DRAIN/INJ JOINT/BURSA W/O US: CPT | Mod: RT

## 2024-04-10 PROCEDURE — 99204 OFFICE O/P NEW MOD 45 MIN: CPT | Mod: 25

## 2024-04-10 PROCEDURE — 73010 X-RAY EXAM OF SHOULDER BLADE: CPT | Mod: RT

## 2024-04-10 RX ORDER — IBUPROFEN 600 MG/1
600 TABLET, FILM COATED ORAL
Refills: 0 | Status: ACTIVE | COMMUNITY

## 2024-04-10 RX ORDER — GUAIFENESIN 1200 MG/1
500 TABLET, EXTENDED RELEASE ORAL
Refills: 0 | Status: ACTIVE | COMMUNITY

## 2024-04-10 NOTE — PROCEDURE
[FreeTextEntry1] : r shoulder csi [FreeTextEntry2] : R shoulder pain/frozen [FreeTextEntry3] : Patient Identification   Name/: Verbal with patient and/or family      Procedure Verification:   Procedure confirmed with patient or family/designee   Consent for procedure: Verbal Consent Given   Relevant documentation completed, reviewed, and signed   Clinical indications for procedure confirmed      Time-out with all members of procedure team immediately prior to procedure:   Correct patient identified. Agreement on procedure. Correct side and site.      Manual guided SHOULDER SUBACROMIAL INJECTION - RIGHT   After verbal consent and identification of the correct patient and correct site, the posterior right shoulder was prepped using alcohol swabs and betadine. This was allowed time to air dry. After ethyl chloride spray for skin anesthesia, a mixture of 1cc kenalog 40mg/ml, 3cc Lidocaine 1%, and 3cc Bupivacaine 0.5% was injected under ultrasound guidance into the subacromial space from posterior using a sterile 22G needle. The patient tolerated the procedure well. After-care instructions were provided and included instructions to ice the area and to call if redness, pain, or fever develop.

## 2024-04-10 NOTE — REASON FOR VISIT
[FreeTextEntry2] : 04/10/2024 :REINALDO MANCERA , a 83 year old female, presents today for right shoulder pain

## 2024-04-10 NOTE — DISCUSSION/SUMMARY
[de-identified] :   We discussed their diagnosis and treatment options at length including the risks and benefits of both surgical and non-surgical options.  - We will continue conservative treatment with a course of PT, icing, and anti-inflammatory medication.  - The patient was provided with a prescription to work on scapular strengthening and rotator cuff strengthening.  - The patient was advised to let pain guide the gradual advancement of activities.  - Follow up as needed in 6 weeks to re-evaluate progress with therapy  We discussed that should her symptoms fail to improve we will obtain an MR and CT scan for possible RSA.  Pt is diabetic w/ hbac1 unknown BGM today 110.  RTC 6 weeks  next visit: consider MR

## 2024-04-10 NOTE — HISTORY OF PRESENT ILLNESS
[Dull/Aching] : dull/aching [Localized] : localized [Intermittent] : intermittent [Nothing helps with pain getting better] : Nothing helps with pain getting better [Exercising] : exercising [Retired] : Work status: retired [de-identified] : Date of Injury/Onset: 04/10/2024 :REINALDO MANCERA , a 83 year old female, presents today for right shoulder pain, 2 week onset. Was seen by PCP had 2 view shoulder xray which she provided images today and was prescribed NSAID's which help temporarily Pain: At Rest: 8/10 With Activity: 8/10 Mechanism of injury: pain with rom  This is NOT a Work Related Injury being treated under Worker's Compensation. This is NOT an athletic injury occurring associated with an interscholastic or organized sports team. Quality of symptoms: pain limited rom Improves with: Worse with:some relief with ibuprofin Prior treatment: xray nsaids Prior Imaging:xray  Reports Available For Review Today: Out of work/sport: retired School/Sport/Position/Occupation: retired Personal goal: Additional Information: Denies any prior tx R hand dominant states she was able to forward elevate her arm normally up until weeks ago.  she denies any trauma injury accident known diabetic

## 2024-04-10 NOTE — IMAGING
[de-identified] : RIGHT  SHOULDER  Inspection: No swelling.   Palpation : Tenderness is noted at the anterior shoulder and lateral shoulder.  Range of motion: There is pain with range of motion. There is decreased range of motion with pain. Active motion equals passive motion.   , ER 40  Strength: There is pain, weakness, and discomfort with strength testing.  Neurological testings: motor and sensor intact distally.  Ligament Stability and Special Testss:   There is positive arc of pain.   Shoulder apprehension: neg  Shoulder relocation: neg  Obriens test: pos  Biceps Active test: neg  Falcon Labral Shear: neg  Impingement testing: pos  Michi testing: pos  Whipple: pos  Cross Body Adduction: neg

## 2024-04-10 NOTE — DATA REVIEWED
[FreeTextEntry1] : RightX-Ray Examination of the SHOULDER 2 views:  no fractures, subluxations or dislocations.   X-Ray Examination of the SCAPULA 1 or 2 views shows: there is an acromial spur  , AC arthrosis appreciated, humeral centered, no disruption in shenton line

## 2024-05-22 ENCOUNTER — APPOINTMENT (OUTPATIENT)
Dept: ORTHOPEDIC SURGERY | Facility: CLINIC | Age: 84
End: 2024-05-22

## 2024-05-28 ENCOUNTER — APPOINTMENT (OUTPATIENT)
Dept: ORTHOPEDIC SURGERY | Facility: CLINIC | Age: 84
End: 2024-05-28
Payer: MEDICARE

## 2024-05-28 DIAGNOSIS — M75.111 INCOMPLETE ROTATOR CUFF TEAR OR RUPTURE OF RIGHT SHOULDER, NOT SPECIFIED AS TRAUMATIC: ICD-10-CM

## 2024-05-28 DIAGNOSIS — M75.01 ADHESIVE CAPSULITIS OF RIGHT SHOULDER: ICD-10-CM

## 2024-05-28 DIAGNOSIS — M19.019 PRIMARY OSTEOARTHRITIS, UNSPECIFIED SHOULDER: ICD-10-CM

## 2024-05-28 PROCEDURE — 99213 OFFICE O/P EST LOW 20 MIN: CPT

## 2024-05-28 RX ORDER — MELOXICAM 7.5 MG/1
7.5 TABLET ORAL
Qty: 30 | Refills: 0 | Status: ACTIVE | COMMUNITY
Start: 2024-04-10 | End: 1900-01-01

## 2024-05-28 NOTE — DISCUSSION/SUMMARY
[de-identified] :   We discussed their diagnosis and treatment options at length including the risks and benefits of both surgical and non-surgical options.  - We will continue conservative treatment with a course of PT, icing, and anti-inflammatory medication.  - The patient was provided with a prescription to work on scapular strengthening and rotator cuff strengthening.  - The patient was advised to let pain guide the gradual advancement of activities.  - Follow up as needed in 6 weeks to re-evaluate progress with therapy  We discussed that should her symptoms fail to improve we will obtain an MR and CT scan for possible RSA.  Pt is diabetic w/ hbac1 unknown BGM today 110.  RTC 6 weeks  next visit: consider MR  ***** R frozen shoulder improving with PT , 40 FE/ER injection wore off after 4 weeks Mobic Rx provided, PT rx provided  next visit: If contuining to improve consider 2nd CSI v MR R shoulder

## 2024-05-28 NOTE — HISTORY OF PRESENT ILLNESS
[Dull/Aching] : dull/aching [Localized] : localized [Intermittent] : intermittent [Nothing helps with pain getting better] : Nothing helps with pain getting better [Exercising] : exercising [Retired] : Work status: retired [de-identified] : Date of Injury/Onset: 04/10/2024 :REINALDO MANCERA , a 83 year old female, presents today for right shoulder pain, 2 week onset. Was seen by PCP had 2 view shoulder xray which she provided images today and was prescribed NSAID's which help temporarily Pain: At Rest: 8/10 With Activity: 8/10 Mechanism of injury: pain with rom  This is NOT a Work Related Injury being treated under Worker's Compensation. This is NOT an athletic injury occurring associated with an interscholastic or organized sports team. Quality of symptoms: pain limited rom Improves with: Worse with:some relief with ibuprofin Prior treatment: xray nsaids Prior Imaging:xray  Reports Available For Review Today: Out of work/sport: retired School/Sport/Position/Occupation: retired Personal goal: Additional Information: Denies any prior tx R hand dominant states she was able to forward elevate her arm normally up until weeks ago.  she denies any trauma injury accident known diabetic  05/28/2024 REINALDO 83 year F is here today to follow up on right shoulder. Patient reports some improvement since last visit, patient is doing PT, applies heating pads and is taking tylenol as needed for pain relief.

## 2024-05-28 NOTE — IMAGING
[de-identified] : RIGHT  SHOULDER  Inspection: No swelling.   Palpation : Tenderness is noted at the anterior shoulder and lateral shoulder.  Range of motion: There is pain with range of motion. There is decreased range of motion with pain. Active motion equals passive motion.   , ER 40  Strength: There is pain, weakness, and discomfort with strength testing.  Neurological testings: motor and sensor intact distally.  Ligament Stability and Special Testss:   There is positive arc of pain.   Shoulder apprehension: neg  Shoulder relocation: neg  Obriens test: pos  Biceps Active test: neg  Falcon Labral Shear: neg  Impingement testing: pos  Michi testing: pos  Whipple: pos  Cross Body Adduction: neg

## 2024-07-09 ENCOUNTER — APPOINTMENT (OUTPATIENT)
Dept: ORTHOPEDIC SURGERY | Facility: CLINIC | Age: 84
End: 2024-07-09

## 2024-07-09 DIAGNOSIS — M75.01 ADHESIVE CAPSULITIS OF RIGHT SHOULDER: ICD-10-CM

## 2024-07-09 DIAGNOSIS — M19.019 PRIMARY OSTEOARTHRITIS, UNSPECIFIED SHOULDER: ICD-10-CM

## 2024-07-09 PROCEDURE — 20605 DRAIN/INJ JOINT/BURSA W/O US: CPT | Mod: RT

## 2024-07-09 PROCEDURE — 99213 OFFICE O/P EST LOW 20 MIN: CPT | Mod: 25

## 2024-08-20 ENCOUNTER — APPOINTMENT (OUTPATIENT)
Dept: ORTHOPEDIC SURGERY | Facility: CLINIC | Age: 84
End: 2024-08-20
Payer: MEDICARE

## 2024-08-20 DIAGNOSIS — M75.111 INCOMPLETE ROTATOR CUFF TEAR OR RUPTURE OF RIGHT SHOULDER, NOT SPECIFIED AS TRAUMATIC: ICD-10-CM

## 2024-08-20 DIAGNOSIS — M19.019 PRIMARY OSTEOARTHRITIS, UNSPECIFIED SHOULDER: ICD-10-CM

## 2024-08-20 PROCEDURE — 99213 OFFICE O/P EST LOW 20 MIN: CPT

## 2024-08-20 NOTE — IMAGING
[de-identified] : RIGHT  SHOULDER  Inspection: No swelling.   Palpation : Tenderness is noted at the anterior shoulder and lateral shoulder.  Range of motion: There is pain with range of motion. There is decreased range of motion with pain. Active motion equals passive motion.   , ER 40  Strength: There is pain, weakness, and discomfort with strength testing.  Neurological testings: motor and sensor intact distally.  Ligament Stability and Special Testss:   There is positive arc of pain.   Shoulder apprehension: neg  Shoulder relocation: neg  Obriens test: pos  Biceps Active test: neg  Falcon Labral Shear: neg  Impingement testing: pos  Michi testing: pos  Whipple: pos  Cross Body Adduction: neg

## 2024-08-20 NOTE — HISTORY OF PRESENT ILLNESS
[Dull/Aching] : dull/aching [Localized] : localized [Intermittent] : intermittent [Nothing helps with pain getting better] : Nothing helps with pain getting better [Exercising] : exercising [Retired] : Work status: retired [de-identified] : Date of Injury/Onset: 04/10/2024 :REINALDO MANCERA , a 83 year old female, presents today for right shoulder pain, 2 week onset. Was seen by PCP had 2 view shoulder xray which she provided images today and was prescribed NSAID's which help temporarily Pain: At Rest: 8/10 With Activity: 8/10 Mechanism of injury: pain with rom  This is NOT a Work Related Injury being treated under Worker's Compensation. This is NOT an athletic injury occurring associated with an interscholastic or organized sports team. Quality of symptoms: pain limited rom Improves with: Worse with:some relief with ibuprofin Prior treatment: xray nsaids Prior Imaging:xray  Reports Available For Review Today: Out of work/sport: retired School/Sport/Position/Occupation: retired Personal goal: Additional Information: Denies any prior tx R hand dominant states she was able to forward elevate her arm normally up until weeks ago.  she denies any trauma injury accident known diabetic  05/28/2024 REINALDO 83 year F is here today to follow up on right shoulder. Patient reports some improvement since last visit, patient is doing PT, applies heating pads and is taking tylenol as needed for pain relief.  07/09/2024 REINALDO 83 year F is here today to follow up on right frozen shoulder. Patient reports slight improvement in pain since last visit. Patient has been going to PT, with improvement noted. She is taking tylenol for pain. Patient states her prior CSI from 4/10/24 helped her pain. Patient is interested in CSI today.  08/20/2024 REINALDO 84 year F is here today to follow up on right shoulder. Patient reports some improvement with pain after CSI. Patient is doing PT, and had been taken Mobic

## 2024-08-20 NOTE — DISCUSSION/SUMMARY
[de-identified] :   We discussed their diagnosis and treatment options at length including the risks and benefits of both surgical and non-surgical options.  - We will continue conservative treatment with a course of PT, icing, and anti-inflammatory medication.  - The patient was provided with a prescription to work on scapular strengthening and rotator cuff strengthening.  - The patient was advised to let pain guide the gradual advancement of activities.  - Follow up as needed in 6 weeks to re-evaluate progress with therapy  We discussed that should her symptoms fail to improve we will obtain an MR and CT scan for possible RSA.  Pt is diabetic w/ hbac1 unknown BGM today 110.  RTC 6 weeks  next visit: consider MR  ***** R frozen shoulder improving with PT , 40 FE/ER injection wore off after 4 weeks Mobic Rx provided, PT rx provided  next visit: If contuining to improve consider 2nd CSI v MR R shoulder *** R frozen shoulder improving with PT , 60, R hip FE/ER/IR CSI given to R ac joint as that was site of most pain today Mobic Rx provided, PT rx provided  next visit: If contuining to improve continue PT v MR R shoulder *** R frozen shoulder improving with PT , 70, R hip FE/ER/IR Mobic Rx provided, PT rx provided  next visit: If contuining to improve continue PT v MR R shoulder

## 2024-09-08 ENCOUNTER — NON-APPOINTMENT (OUTPATIENT)
Age: 84
End: 2024-09-08

## 2024-09-09 ENCOUNTER — APPOINTMENT (OUTPATIENT)
Dept: GYNECOLOGIC ONCOLOGY | Facility: CLINIC | Age: 84
End: 2024-09-09
Payer: MEDICARE

## 2024-09-09 ENCOUNTER — RESULT REVIEW (OUTPATIENT)
Age: 84
End: 2024-09-09

## 2024-09-09 VITALS
BODY MASS INDEX: 36.98 KG/M2 | SYSTOLIC BLOOD PRESSURE: 155 MMHG | OXYGEN SATURATION: 99 % | WEIGHT: 244 LBS | DIASTOLIC BLOOD PRESSURE: 84 MMHG | HEART RATE: 85 BPM | HEIGHT: 68 IN

## 2024-09-09 DIAGNOSIS — K43.9 VENTRAL HERNIA W/OUT OBSTRUCTION OR GANGRENE: ICD-10-CM

## 2024-09-09 PROCEDURE — 99459 PELVIC EXAMINATION: CPT

## 2024-09-09 PROCEDURE — 99214 OFFICE O/P EST MOD 30 MIN: CPT

## 2024-09-09 RX ORDER — ALBUTEROL 90 MCG
90 AEROSOL (GRAM) INHALATION
Refills: 0 | Status: ACTIVE | COMMUNITY

## 2024-09-09 RX ORDER — GABAPENTIN 300 MG/1
300 CAPSULE ORAL
Refills: 0 | Status: ACTIVE | COMMUNITY

## 2024-09-09 RX ORDER — CARVEDILOL 12.5 MG/1
12.5 TABLET, FILM COATED ORAL
Refills: 0 | Status: ACTIVE | COMMUNITY

## 2024-09-09 RX ORDER — ATORVASTATIN CALCIUM 20 MG/1
20 TABLET, FILM COATED ORAL
Refills: 0 | Status: ACTIVE | COMMUNITY

## 2024-09-09 RX ORDER — AMMONIUM LACTATE 12 %
12 CREAM (GRAM) TOPICAL
Refills: 0 | Status: ACTIVE | COMMUNITY

## 2024-09-09 RX ORDER — SEMAGLUTIDE 0.68 MG/ML
INJECTION, SOLUTION SUBCUTANEOUS
Refills: 0 | Status: ACTIVE | COMMUNITY

## 2024-09-09 RX ORDER — SACUBITRIL AND VALSARTAN 49; 51 MG/1; MG/1
49-51 TABLET, FILM COATED ORAL
Refills: 0 | Status: ACTIVE | COMMUNITY

## 2024-09-09 RX ORDER — NALOXONE HYDROCHLORIDE 4 MG/.1ML
4 SPRAY NASAL
Refills: 0 | Status: ACTIVE | COMMUNITY

## 2024-09-09 RX ORDER — FLUTICASONE PROPIONATE 220 UG/1
AEROSOL, METERED RESPIRATORY (INHALATION)
Refills: 0 | Status: ACTIVE | COMMUNITY

## 2024-09-09 RX ORDER — LORATADINE 10 MG/1
10 TABLET ORAL
Refills: 0 | Status: ACTIVE | COMMUNITY

## 2024-09-09 RX ORDER — ALLOPURINOL 100 MG/1
100 TABLET ORAL
Refills: 0 | Status: ACTIVE | COMMUNITY

## 2024-09-09 RX ORDER — GLIMEPIRIDE 1 MG/1
1 TABLET ORAL
Refills: 0 | Status: ACTIVE | COMMUNITY

## 2024-09-09 RX ORDER — DOCUSATE SODIUM 100 MG
100 CAPSULE ORAL
Refills: 0 | Status: ACTIVE | COMMUNITY

## 2024-09-09 RX ORDER — FLUTICASONE FUROATE AND VILANTEROL 200; 25 UG/1; UG/1
POWDER RESPIRATORY (INHALATION)
Refills: 0 | Status: ACTIVE | COMMUNITY

## 2024-09-09 RX ORDER — FLUTICASONE FUROATE, UMECLIDINIUM BROMIDE AND VILANTEROL TRIFENATATE 200; 62.5; 25 UG/1; UG/1; UG/1
POWDER RESPIRATORY (INHALATION)
Refills: 0 | Status: ACTIVE | COMMUNITY

## 2024-09-09 RX ORDER — OMEPRAZOLE 40 MG/1
40 CAPSULE, DELAYED RELEASE ORAL
Refills: 0 | Status: ACTIVE | COMMUNITY

## 2024-09-09 RX ORDER — ISOSORBIDE DINITRATE 20 MG/1
20 TABLET ORAL
Refills: 0 | Status: ACTIVE | COMMUNITY

## 2024-09-09 NOTE — HISTORY OF PRESENT ILLNESS
[FreeTextEntry1] : Stage IA (NI) G1 endometrial adenocarcinoma- surveillance  RH TLH, BSO, LNS, Hernia Repair (JK) - 11/23/21 Referring GYN - Dr. Sarath Vásquez PCP - Dr. Ashlee Magallon GI - Dr. Donald Fraire Surg: Dr FRANTZ Guillory  MMR: Loss of MLH1, Intact MSH2, MSH6, PMS2 (weak nuclear staining). CN reflects GT of path was proceeding with additional testing. Not available in CD.  Last seen Aug 2022. NSH in Dec 2022.   She RTO with her aide. She moves with a rolling walker and is wearing bhavani knee braces. She notes no VB or VD. She reports no GI or  concerns. Was told by PCP to f/u with me and is anxious she notes. She is to participate in a cancer survivor event she shared.   HCM: SON: She yet again confirmed that her PCP does her CBE.  Colonoscopy: 2019 COVID-19 vaccine series completed, 2/2021, Pfizer

## 2024-09-09 NOTE — REVIEW OF SYSTEMS
[Negative] : Gastrointestinal [Diabetes] : diabetes mellitus [de-identified] : HTN, HLD [de-identified] : DON [de-identified] : Uses a walker

## 2024-09-09 NOTE — PHYSICAL EXAM
[Chaperone Present] : A chaperone was present in the examining room during all aspects of the physical examination [20237] : A chaperone was present during the pelvic exam. [Abnormal] : Focal defects were observed [Absent] : Adnexa(ae): Absent [Normal] : Recto-Vaginal Exam: Normal [FreeTextEntry2] : Suzie  [FreeTextEntry1] : Ambulates with a walker [de-identified] : 3+ edema [de-identified] : Inc CDI [de-identified] : Ambulates with Walker; bhavani knee braces on [de-identified] : atrophy, NGL [de-identified] : unable to lift left leg

## 2024-09-09 NOTE — DISCUSSION/SUMMARY
[Reviewed Clinical Lab Test(s)] : Results of clinical tests were reviewed. [Reviewed Radiology Report(s)] : Radiology reports were reviewed. [FreeTextEntry1] : I met with the pt and her aid.  -She remains clinically MASON.  -We disc her eval today and the plan for surv. -We disc my concern for poss hernia recurrence and my advice for imaging to eval this. She said she was agreeable and an Rx given for a CT A/P and the [facility] contact sheet. I asked her to contact the office for a f/u disc after the imaging is completed, which she acknowledged.  -Again emailed path (DD) re MMR f/u ancillary testing. -We disc her BHM and she confirms that her PCP directs this.  -Her instructions were disc. -All Q/A. -She'll RTO in 6m, sooner prn.  -Effort for the visit includes the note prep, review of prior material, interview, exam, further documentation, and coordination of care.

## 2024-09-13 ENCOUNTER — APPOINTMENT (OUTPATIENT)
Dept: CT IMAGING | Facility: CLINIC | Age: 84
End: 2024-09-13

## 2024-09-13 ENCOUNTER — OUTPATIENT (OUTPATIENT)
Dept: OUTPATIENT SERVICES | Facility: HOSPITAL | Age: 84
LOS: 1 days | End: 2024-09-13
Payer: MEDICARE

## 2024-09-13 DIAGNOSIS — C54.1 MALIGNANT NEOPLASM OF ENDOMETRIUM: ICD-10-CM

## 2024-09-13 DIAGNOSIS — Z96.659 PRESENCE OF UNSPECIFIED ARTIFICIAL KNEE JOINT: Chronic | ICD-10-CM

## 2024-09-13 DIAGNOSIS — K43.9 VENTRAL HERNIA WITHOUT OBSTRUCTION OR GANGRENE: ICD-10-CM

## 2024-09-13 PROCEDURE — 74177 CT ABD & PELVIS W/CONTRAST: CPT

## 2024-09-13 PROCEDURE — 74177 CT ABD & PELVIS W/CONTRAST: CPT | Mod: 26,MH

## 2024-10-01 ENCOUNTER — APPOINTMENT (OUTPATIENT)
Dept: ORTHOPEDIC SURGERY | Facility: CLINIC | Age: 84
End: 2024-10-01

## 2024-10-03 ENCOUNTER — NON-APPOINTMENT (OUTPATIENT)
Age: 84
End: 2024-10-03

## 2024-10-03 DIAGNOSIS — R91.1 SOLITARY PULMONARY NODULE: ICD-10-CM

## 2024-10-10 ENCOUNTER — OUTPATIENT (OUTPATIENT)
Dept: OUTPATIENT SERVICES | Facility: HOSPITAL | Age: 84
LOS: 1 days | End: 2024-10-10
Payer: MEDICARE

## 2024-10-10 ENCOUNTER — APPOINTMENT (OUTPATIENT)
Dept: CT IMAGING | Facility: CLINIC | Age: 84
End: 2024-10-10

## 2024-10-10 DIAGNOSIS — Z96.659 PRESENCE OF UNSPECIFIED ARTIFICIAL KNEE JOINT: Chronic | ICD-10-CM

## 2024-10-10 DIAGNOSIS — Z00.00 ENCOUNTER FOR GENERAL ADULT MEDICAL EXAMINATION WITHOUT ABNORMAL FINDINGS: ICD-10-CM

## 2024-10-10 PROCEDURE — 71260 CT THORAX DX C+: CPT | Mod: 26,MH

## 2024-10-30 ENCOUNTER — APPOINTMENT (OUTPATIENT)
Dept: ORTHOPEDIC SURGERY | Facility: CLINIC | Age: 84
End: 2024-10-30
Payer: MEDICARE

## 2024-10-30 DIAGNOSIS — M75.111 INCOMPLETE ROTATOR CUFF TEAR OR RUPTURE OF RIGHT SHOULDER, NOT SPECIFIED AS TRAUMATIC: ICD-10-CM

## 2024-10-30 DIAGNOSIS — M75.01 ADHESIVE CAPSULITIS OF RIGHT SHOULDER: ICD-10-CM

## 2024-10-30 PROCEDURE — 99213 OFFICE O/P EST LOW 20 MIN: CPT

## 2024-11-20 PROCEDURE — 71260 CT THORAX DX C+: CPT

## 2025-02-04 ENCOUNTER — APPOINTMENT (OUTPATIENT)
Dept: ORTHOPEDIC SURGERY | Facility: CLINIC | Age: 85
End: 2025-02-04

## 2025-02-18 ENCOUNTER — APPOINTMENT (OUTPATIENT)
Dept: PEDIATRIC MEDICAL GENETICS | Facility: CLINIC | Age: 85
End: 2025-02-18

## 2025-02-19 ENCOUNTER — APPOINTMENT (OUTPATIENT)
Dept: GYNECOLOGIC ONCOLOGY | Facility: CLINIC | Age: 85
End: 2025-02-19
Payer: MEDICARE

## 2025-02-19 DIAGNOSIS — K86.9 DISEASE OF PANCREAS, UNSPECIFIED: ICD-10-CM

## 2025-02-19 DIAGNOSIS — L80 VITILIGO: ICD-10-CM

## 2025-02-19 DIAGNOSIS — B37.31 ACUTE CANDIDIASIS OF VULVA AND VAGINA: ICD-10-CM

## 2025-02-19 DIAGNOSIS — C54.1 MALIGNANT NEOPLASM OF ENDOMETRIUM: ICD-10-CM

## 2025-02-19 DIAGNOSIS — R91.1 SOLITARY PULMONARY NODULE: ICD-10-CM

## 2025-02-19 DIAGNOSIS — K43.9 VENTRAL HERNIA W/OUT OBSTRUCTION OR GANGRENE: ICD-10-CM

## 2025-02-19 PROCEDURE — 99214 OFFICE O/P EST MOD 30 MIN: CPT

## 2025-02-19 PROCEDURE — 99459 PELVIC EXAMINATION: CPT

## 2025-02-20 ENCOUNTER — NON-APPOINTMENT (OUTPATIENT)
Age: 85
End: 2025-02-20

## 2025-02-20 VITALS — HEART RATE: 80 BPM | RESPIRATION RATE: 18 BRPM | HEIGHT: 68 IN | OXYGEN SATURATION: 97 % | TEMPERATURE: 98 F

## 2025-03-07 ENCOUNTER — APPOINTMENT (OUTPATIENT)
Dept: DERMATOLOGY | Facility: CLINIC | Age: 85
End: 2025-03-07
Payer: MEDICARE

## 2025-03-07 DIAGNOSIS — L81.9 DISORDER OF PIGMENTATION, UNSPECIFIED: ICD-10-CM

## 2025-03-07 PROCEDURE — 99203 OFFICE O/P NEW LOW 30 MIN: CPT

## 2025-03-07 RX ORDER — CLOBETASOL PROPIONATE 0.5 MG/G
0.05 OINTMENT TOPICAL
Qty: 1 | Refills: 1 | Status: ACTIVE | COMMUNITY
Start: 2025-03-07 | End: 1900-01-01

## 2025-04-02 ENCOUNTER — APPOINTMENT (OUTPATIENT)
Dept: HEMATOLOGY ONCOLOGY | Facility: CLINIC | Age: 85
End: 2025-04-02

## 2025-04-10 ENCOUNTER — APPOINTMENT (OUTPATIENT)
Dept: ORTHOPEDIC SURGERY | Facility: CLINIC | Age: 85
End: 2025-04-10

## 2025-04-10 DIAGNOSIS — M25.511 PAIN IN RIGHT SHOULDER: ICD-10-CM

## 2025-04-10 DIAGNOSIS — Z78.9 OTHER SPECIFIED HEALTH STATUS: ICD-10-CM

## 2025-04-10 DIAGNOSIS — Z86.39 PERSONAL HISTORY OF OTHER ENDOCRINE, NUTRITIONAL AND METABOLIC DISEASE: ICD-10-CM

## 2025-04-10 DIAGNOSIS — Z86.79 PERSONAL HISTORY OF OTHER DISEASES OF THE CIRCULATORY SYSTEM: ICD-10-CM

## 2025-04-10 DIAGNOSIS — E78.00 PURE HYPERCHOLESTEROLEMIA, UNSPECIFIED: ICD-10-CM

## 2025-04-10 DIAGNOSIS — M75.51 BURSITIS OF RIGHT SHOULDER: ICD-10-CM

## 2025-04-10 DIAGNOSIS — M75.01 ADHESIVE CAPSULITIS OF RIGHT SHOULDER: ICD-10-CM

## 2025-04-10 DIAGNOSIS — M19.011 PRIMARY OSTEOARTHRITIS, RIGHT SHOULDER: ICD-10-CM

## 2025-04-10 PROCEDURE — 20610 DRAIN/INJ JOINT/BURSA W/O US: CPT | Mod: RT

## 2025-04-10 PROCEDURE — J3490M: CUSTOM | Mod: NC

## 2025-04-10 PROCEDURE — 73010 X-RAY EXAM OF SHOULDER BLADE: CPT | Mod: RT

## 2025-04-10 PROCEDURE — 99214 OFFICE O/P EST MOD 30 MIN: CPT | Mod: 25

## 2025-04-10 PROCEDURE — 73030 X-RAY EXAM OF SHOULDER: CPT | Mod: RT

## 2025-04-15 ENCOUNTER — NON-APPOINTMENT (OUTPATIENT)
Age: 85
End: 2025-04-15

## 2025-04-16 ENCOUNTER — APPOINTMENT (OUTPATIENT)
Dept: OBGYN | Facility: CLINIC | Age: 85
End: 2025-04-16

## 2025-04-16 VITALS
WEIGHT: 240 LBS | DIASTOLIC BLOOD PRESSURE: 76 MMHG | BODY MASS INDEX: 36.37 KG/M2 | OXYGEN SATURATION: 93 % | HEART RATE: 75 BPM | SYSTOLIC BLOOD PRESSURE: 124 MMHG | HEIGHT: 68 IN

## 2025-04-16 PROCEDURE — 99459 PELVIC EXAMINATION: CPT

## 2025-04-16 PROCEDURE — 99205 OFFICE O/P NEW HI 60 MIN: CPT

## 2025-04-30 ENCOUNTER — APPOINTMENT (OUTPATIENT)
Dept: HEMATOLOGY ONCOLOGY | Facility: CLINIC | Age: 85
End: 2025-04-30

## 2025-05-20 PROBLEM — Z82.49 FAMILY HISTORY OF HEART FAILURE: Status: ACTIVE | Noted: 2025-05-20

## 2025-05-20 PROBLEM — Z80.1 FAMILY HISTORY OF LUNG CANCER: Status: ACTIVE | Noted: 2025-05-20

## 2025-05-20 PROBLEM — Z78.9 DENIES ALCOHOL CONSUMPTION: Status: ACTIVE | Noted: 2025-05-20

## 2025-05-20 PROBLEM — L29.3 PERINEAL IRRITATION: Status: ACTIVE | Noted: 2025-05-20

## 2025-05-20 PROBLEM — R10.2 VULVOVAGINAL DISCOMFORT: Status: ACTIVE | Noted: 2025-05-20

## 2025-05-20 PROBLEM — Z78.9 DOES NOT USE ILLICIT DRUGS: Status: ACTIVE | Noted: 2025-05-20

## 2025-07-21 NOTE — PROVIDER CONTACT NOTE (OTHER) - DATE AND TIME:
25-Nov-2021 12:00 Products Recommended: Sunblock recommendation sheet available to patient General Sunscreen Counseling: I recommended a zinc or titanium- based sunscreen with a SPF of 30 or higher.  I explained that SPF 30 sunscreens block approximately 97 percent of the sun's harmful rays.  Sunscreens should be applied at least 15 minutes prior to expected sun exposure and then every 2 hours after that as long as sun exposure continues. If swimming or exercising sunscreen should be reapplied every 45 minutes to an hour after getting wet or sweating.  One ounce, or the equivalent of a shot glass full of sunscreen, is adequate to protect the skin not covered by a bathing suit. I also recommended a lip balm with a sunscreen as well. Sun protective clothing can be used in lieu of sunscreen but must be worn the entire time you are exposed to the sun's rays. Detail Level: Detailed

## 2025-09-04 ENCOUNTER — TRANSCRIPTION ENCOUNTER (OUTPATIENT)
Age: 85
End: 2025-09-04